# Patient Record
Sex: FEMALE | Race: WHITE | Employment: UNEMPLOYED | URBAN - METROPOLITAN AREA
[De-identification: names, ages, dates, MRNs, and addresses within clinical notes are randomized per-mention and may not be internally consistent; named-entity substitution may affect disease eponyms.]

---

## 2017-11-06 ENCOUNTER — OFFICE VISIT (OUTPATIENT)
Dept: FAMILY MEDICINE | Facility: CLINIC | Age: 47
End: 2017-11-06
Payer: COMMERCIAL

## 2017-11-06 VITALS
TEMPERATURE: 98.4 F | DIASTOLIC BLOOD PRESSURE: 74 MMHG | WEIGHT: 185 LBS | HEIGHT: 70 IN | RESPIRATION RATE: 18 BRPM | BODY MASS INDEX: 26.48 KG/M2 | HEART RATE: 82 BPM | SYSTOLIC BLOOD PRESSURE: 118 MMHG | OXYGEN SATURATION: 98 %

## 2017-11-06 DIAGNOSIS — R79.0 LOW FERRITIN: ICD-10-CM

## 2017-11-06 DIAGNOSIS — R77.8 ABNORMAL SERUM PROTEIN TEST: ICD-10-CM

## 2017-11-06 DIAGNOSIS — Z00.00 ROUTINE HISTORY AND PHYSICAL EXAMINATION OF ADULT: Primary | ICD-10-CM

## 2017-11-06 DIAGNOSIS — R79.89 ABNORMAL THYROID STIMULATING HORMONE (TSH) LEVEL: ICD-10-CM

## 2017-11-06 DIAGNOSIS — R20.9 DISTURBANCE OF SKIN SENSATION: ICD-10-CM

## 2017-11-06 DIAGNOSIS — F41.9 ANXIETY: ICD-10-CM

## 2017-11-06 PROBLEM — L71.9 ROSACEA: Status: ACTIVE | Noted: 2017-11-06

## 2017-11-06 PROBLEM — Z86.69 H/O EYE MUSCLE DISORDER: Status: ACTIVE | Noted: 2017-11-06

## 2017-11-06 LAB
ALBUMIN SERPL-MCNC: 4.1 G/DL (ref 3.4–5)
ALP SERPL-CCNC: 57 U/L (ref 40–150)
ALT SERPL W P-5'-P-CCNC: 34 U/L (ref 0–50)
ANION GAP SERPL CALCULATED.3IONS-SCNC: 8 MMOL/L (ref 3–14)
AST SERPL W P-5'-P-CCNC: 16 U/L (ref 0–45)
BASOPHILS # BLD AUTO: 0 10E9/L (ref 0–0.2)
BASOPHILS NFR BLD AUTO: 0.1 %
BILIRUB SERPL-MCNC: 0.6 MG/DL (ref 0.2–1.3)
BUN SERPL-MCNC: 13 MG/DL (ref 7–30)
CALCIUM SERPL-MCNC: 9.1 MG/DL (ref 8.5–10.1)
CHLORIDE SERPL-SCNC: 104 MMOL/L (ref 94–109)
CHOLEST SERPL-MCNC: 249 MG/DL
CO2 SERPL-SCNC: 26 MMOL/L (ref 20–32)
CREAT SERPL-MCNC: 0.64 MG/DL (ref 0.52–1.04)
DIFFERENTIAL METHOD BLD: NORMAL
EOSINOPHIL # BLD AUTO: 0.1 10E9/L (ref 0–0.7)
EOSINOPHIL NFR BLD AUTO: 1.2 %
ERYTHROCYTE [DISTWIDTH] IN BLOOD BY AUTOMATED COUNT: 13.9 % (ref 10–15)
FERRITIN SERPL-MCNC: 19 NG/ML (ref 8–252)
GFR SERPL CREATININE-BSD FRML MDRD: >90 ML/MIN/1.7M2
GLUCOSE SERPL-MCNC: 88 MG/DL (ref 70–99)
HCT VFR BLD AUTO: 40.5 % (ref 35–47)
HDLC SERPL-MCNC: 57 MG/DL
HGB BLD-MCNC: 13.3 G/DL (ref 11.7–15.7)
LDLC SERPL CALC-MCNC: 139 MG/DL
LYMPHOCYTES # BLD AUTO: 2.3 10E9/L (ref 0.8–5.3)
LYMPHOCYTES NFR BLD AUTO: 25.8 %
MCH RBC QN AUTO: 28.9 PG (ref 26.5–33)
MCHC RBC AUTO-ENTMCNC: 32.8 G/DL (ref 31.5–36.5)
MCV RBC AUTO: 88 FL (ref 78–100)
MONOCYTES # BLD AUTO: 0.5 10E9/L (ref 0–1.3)
MONOCYTES NFR BLD AUTO: 5.3 %
NEUTROPHILS # BLD AUTO: 6 10E9/L (ref 1.6–8.3)
NEUTROPHILS NFR BLD AUTO: 67.6 %
NONHDLC SERPL-MCNC: 192 MG/DL
PLATELET # BLD AUTO: 259 10E9/L (ref 150–450)
POTASSIUM SERPL-SCNC: 4.1 MMOL/L (ref 3.4–5.3)
PROT SERPL-MCNC: 8.9 G/DL (ref 6.8–8.8)
RBC # BLD AUTO: 4.61 10E12/L (ref 3.8–5.2)
SODIUM SERPL-SCNC: 138 MMOL/L (ref 133–144)
T4 FREE SERPL-MCNC: 0.85 NG/DL (ref 0.76–1.46)
TRIGL SERPL-MCNC: 267 MG/DL
TSH SERPL DL<=0.005 MIU/L-ACNC: 4.75 MU/L (ref 0.4–4)
VIT B12 SERPL-MCNC: 901 PG/ML (ref 193–986)
WBC # BLD AUTO: 8.9 10E9/L (ref 4–11)

## 2017-11-06 PROCEDURE — 80061 LIPID PANEL: CPT | Performed by: FAMILY MEDICINE

## 2017-11-06 PROCEDURE — 36415 COLL VENOUS BLD VENIPUNCTURE: CPT | Performed by: FAMILY MEDICINE

## 2017-11-06 PROCEDURE — 80050 GENERAL HEALTH PANEL: CPT | Performed by: FAMILY MEDICINE

## 2017-11-06 PROCEDURE — 82728 ASSAY OF FERRITIN: CPT | Performed by: FAMILY MEDICINE

## 2017-11-06 PROCEDURE — 84439 ASSAY OF FREE THYROXINE: CPT | Performed by: FAMILY MEDICINE

## 2017-11-06 PROCEDURE — 99386 PREV VISIT NEW AGE 40-64: CPT | Performed by: FAMILY MEDICINE

## 2017-11-06 PROCEDURE — 82607 VITAMIN B-12: CPT | Performed by: FAMILY MEDICINE

## 2017-11-06 RX ORDER — MULTIPLE VITAMINS W/ MINERALS TAB 9MG-400MCG
1 TAB ORAL DAILY
COMMUNITY

## 2017-11-06 ASSESSMENT — PAIN SCALES - GENERAL: PAINLEVEL: NO PAIN (0)

## 2017-11-06 NOTE — PROGRESS NOTES
"   SUBJECTIVE:   CC: Viki Woodard is an 47 year old woman who presents for preventive health visit.     Healthy Habits:    Do you get at least three servings of calcium containing foods daily (dairy, green leafy vegetables, etc.)? yes    Amount of exercise or daily activities, outside of work: 2-3 day(s) per week    Problems taking medications regularly not applicable    Medication side effects: No    Have you had an eye exam in the past two years? yes    Do you see a dentist twice per year? yes    Do you have sleep apnea, excessive snoring or daytime drowsiness?no      {additional problems to add (Optional):439425}    Today's PHQ-2 Score:   PHQ-2 ( 1999 Pfizer) 11/6/2017   Q1: Little interest or pleasure in doing things 0   Q2: Feeling down, depressed or hopeless 0   PHQ-2 Score 0       Abuse: Current or Past(Physical, Sexual or Emotional)- No  Do you feel safe in your environment - Yes    Social History   Substance Use Topics     Smoking status: Never Smoker     Smokeless tobacco: Never Used     Alcohol use Yes     The patient does not drink >3 drinks per day nor >7 drinks per week.    Reviewed orders with patient.  Reviewed health maintenance and updated orders accordingly - Yes  {Chronicprobdata (Optional):637308}    {Mammo Decision Support (Optional):394733}    Pertinent mammograms are reviewed under the imaging tab.  History of abnormal Pap smear: {PAP HX:729975}    Reviewed and updated as needed this visit by clinical staff  Tobacco  Allergies  Meds  Med Hx  Surg Hx  Fam Hx  Soc Hx        Reviewed and updated as needed this visit by Provider        {HISTORY OPTIONS (Optional):910113}    ROS:  {FEMALE PREVENTATIVE ROS:001536}    OBJECTIVE:   /74 (BP Location: Right arm, Patient Position: Chair, Cuff Size: Adult Regular)  Pulse 82  Temp 98.4  F (36.9  C) (Oral)  Resp 18  Ht 5' 9.75\" (1.772 m)  Wt 185 lb (83.9 kg)  LMP 10/22/2017 (Approximate)  SpO2 98%  Breastfeeding? No  BMI 26.74 " "kg/m2  EXAM:  {Exam Choices:734716}    ASSESSMENT/PLAN:   {Diag Picklist:269852}    COUNSELING:   {FEMALE COUNSELING MESSAGES:846728::\"Reviewed preventive health counseling, as reflected in patient instructions\"}    {BP Counseling- Complete if BP >= 120/80  (Optional):386296}     reports that she has never smoked. She has never used smokeless tobacco.  {Tobacco Cessation -- Complete if patient is a smoker (Optional):755340}  Estimated body mass index is 26.74 kg/(m^2) as calculated from the following:    Height as of this encounter: 5' 9.75\" (1.772 m).    Weight as of this encounter: 185 lb (83.9 kg).   {Weight Management Plan (ACO) Complete if BMI is abnormal-  Ages 18-64  BMI >24.9.  Age 65+ with BMI <23 or >30 (Optional):732424}    Counseling Resources:  ATP IV Guidelines  Pooled Cohorts Equation Calculator  Breast Cancer Risk Calculator  FRAX Risk Assessment  ICSI Preventive Guidelines  Dietary Guidelines for Americans, 2010  [employer] MyPlate  ASA Prophylaxis  Lung CA Screening    Elise Shannon MD  [facility]  "

## 2017-11-06 NOTE — MR AVS SNAPSHOT
After Visit Summary   11/6/2017    Viki Woodard    MRN: [id number]           Patient Information     Date Of Birth          1970        Visit Information        Provider Department      11/6/2017 7:40 AM Elise Shannon MD [facility]        Today's Diagnoses     Routine history and physical examination of adult    -  1    Disturbance of skin sensation        Anxiety          Care Instructions      Preventive Health Recommendations  Female Ages 40 to 49    Yearly exam:     See your health care provider every year in order to  1. Review health changes.   2. Discuss preventive care.    3. Review your medicines if your doctor prescribed any.      Get a Pap test every three years (unless you have an abnormal result and your provider advises testing more often).      If you get Pap tests with HPV test, you only need to test every 5 years, unless you have an abnormal result. You do not need a Pap test if your uterus was removed (hysterectomy) and you have not had cancer.      You should be tested each year for STDs (sexually transmitted diseases), if you're at risk.       Ask your doctor if you should have a mammogram.      Have a colonoscopy (test for colon cancer) if someone in your family has had colon cancer or polyps before age 50.       Have a cholesterol test every 5 years.       Have a diabetes test (fasting glucose) after age 45. If you are at risk for diabetes, you should have this test every 3 years.    Shots: Get a flu shot each year. Get a tetanus shot every 10 years.     Nutrition:     Eat at least 5 servings of fruits and vegetables each day.    Eat whole-grain bread, whole-wheat pasta and brown rice instead of white grains and rice.    Talk to your provider about Calcium and Vitamin D.     Lifestyle    Exercise at least 150 minutes a week (an average of 30 minutes a day, 5 days a week). This will help you control your weight and prevent disease.    Limit  alcohol to one drink per day.    No smoking.     Wear sunscreen to prevent skin cancer.    See your dentist every six months for an exam and cleaning.          Follow-ups after your visit        Additional Services     MENTAL HEALTH REFERRAL       Your provider has referred you to: [facility] - Counseling (Individual/Couples/Family) - [facility] [phone]   [email or web address]   *Patient will be contacted by [facility] scheduling partner, [facility]), to schedule an appointment.  Patients may also call [facility] to schedule.    All scheduling is subject to the client's specific insurance plan & benefits, provider/location availability, and provider clinical specialities.  Please arrive 15 minutes early for your first appointment and bring your completed paperwork.    Please be aware that coverage of these services is subject to the terms and limitations of your health insurance plan.  Call member services at your health plan with any benefit or coverage questions.                  Who to contact     If you have questions or need follow up information about today's clinic visit or your schedule please contact [facility] directly at [phone].  Normal or non-critical lab and imaging results will be communicated to you by MyChart, letter or phone within 4 business days after the clinic has received the results. If you do not hear from us within 7 days, please contact the clinic through [facility] or phone. If you have a critical or abnormal lab result, we will notify you by phone as soon as possible.  Submit refill requests through [employer] or call your pharmacy and they will forward the refill request to us. Please allow 3 business days for your refill to be completed.          Additional Information About Your Visit        [facility] Information     [employer] lets you send messages to your  "doctor, view your test results, renew your prescriptions, schedule appointments and more. To sign up, go to [email or web address] . Click on \"Log in\" on the left side of the screen, which will take you to the Welcome page. Then click on \"Sign up Now\" on the right side of the page.     You will be asked to enter the access code listed below, as well as some personal information. Please follow the directions to create your username and password.     Your access code is: [email or web address]  Expires: 2018  9:00 AM     Your access code will  in 90 days. If you need help or a new code, please call your [place] clinic or [phone].        Care EveryWhere ID     This is your Care EveryWhere ID. This could be used by other organizations to access your [place] medical records  [id number]        Your Vitals Were     Pulse Temperature Respirations Height Last Period Pulse Oximetry    82 98.4  F (36.9  C) (Oral) 18 5' 9.75\" (1.772 m) 10/22/2017 (Approximate) 98%    Breastfeeding? BMI (Body Mass Index)                No 26.74 kg/m2           Blood Pressure from Last 3 Encounters:   17 118/74    Weight from Last 3 Encounters:   17 185 lb (83.9 kg)              We Performed the Following     CBC with platelets differential     Comprehensive metabolic panel     Ferritin     Lipid panel reflex to direct LDL Fasting     MENTAL HEALTH REFERRAL     TSH with free T4 reflex     Vitamin B12        Primary Care Provider Office Phone # Fax #    [facility] [phone] [address] [address]        Equal Access to Services     [facility]: Hadii jorge a ku hadasho Sogenaroali, waaxda luqadaha, qaybta kaalmada adejaquelin, octavia plasencia. [facility] [phone].    ATENCIÓN: Si habla español, tiene a kim disposición servicios gratuitos de asistencia lingüística. Llame al [phone].    We comply with applicable federal civil rights laws and " [place] laws. We do not discriminate on the basis of race, color, national origin, age, disability, sex, sexual orientation, or gender identity.            Thank you!     Thank you for choosing [facility]  for your care. Our goal is always to provide you with excellent care. Hearing back from our patients is one way we can continue to improve our services. Please take a few minutes to complete the written survey that you may receive in the mail after your visit with us. Thank you!             Your Updated Medication List - Protect others around you: Learn how to safely use, store and throw away your medicines at www.disposemymeds.org.          This list is accurate as of: 11/6/17  9:00 AM.  Always use your most recent med list.                   Brand Name Dispense Instructions for use Diagnosis    METROGEL EX           Multi-vitamin Tabs tablet      Take 1 tablet by mouth daily

## 2017-11-06 NOTE — PROGRESS NOTES
"   SUBJECTIVE:   CC: Viki Woodard is an 47 year old woman who presents for preventive health visit.     Healthy Habits:    Do you get at least three servings of calcium containing foods daily (dairy, green leafy vegetables, etc.)? yes    Amount of exercise or daily activities, outside of work: 2-3 day(s) per week    Problems taking medications regularly not applicable    Medication side effects: No    Have you had an eye exam in the past two years? yes    Do you see a dentist twice per year? yes    Do you have sleep apnea, excessive snoring or daytime drowsiness?no    Reviewed surgical hx, family hx, past medical hx    Pt has not had a primary physical in years and would like some baseline screening completed.     She started having numbness in hands bilaterally about 2 weeks ago. She does yoga and is wondering if she did something that could have caused this. She said her hands feel somewhat \"asleep\" and fingernails feels \"weird/disconnected.\" numbness is present at the tips of her fingers and in her palms. She has also noticed her legs and torso feel numb when touching them.   -has always felt anxious/worried but never dx'ed or treated. Unsure if numbness would be related to this- does not have more anxiety recently. Others have told her she worries too much.  Denies: pain, loss of strength, changes in vision/hearing, changes in speech, unusual rashes this summer, joint pain, typing often, neck pain, back pain, street drug use, new headaches    Weight varies between gaining and losing 5 lbs.  Wt Readings from Last 5 Encounters:   11/06/17 185 lb (83.9 kg)     Had minor abdominal pain on left side after eating sunflower seeds about 2 weeks ago. Sx's have resolved. She says she has a \"sensitive stomach\" and is careful with what she eats.     Still having regular periods. Periods last for 3 days and she changes her tampon/pad every couple of hours.  Obgyn: Dr. Gutierrez [facility] will see " anually        Today's PHQ-2 Score:   PHQ-2 ( 1999 Pfizer) 11/6/2017   Q1: Little interest or pleasure in doing things 0   Q2: Feeling down, depressed or hopeless 0   PHQ-2 Score 0       Abuse: Current or Past(Physical, Sexual or Emotional)- No  Do you feel safe in your environment - Yes    Social History   Substance Use Topics     Smoking status: Never Smoker     Smokeless tobacco: Never Used     Alcohol use Yes     The patient does not drink >3 drinks per day nor >7 drinks per week.    Reviewed orders with patient.  Reviewed health maintenance and updated orders accordingly - Yes  Labs reviewed in EPIC  BP Readings from Last 3 Encounters:   11/06/17 118/74    Wt Readings from Last 3 Encounters:   11/06/17 185 lb (83.9 kg)                  There is no problem list on file for this patient.    Past Surgical History:   Procedure Laterality Date     EYE SURGERY      Strabismus repair x 3       Social History   Substance Use Topics     Smoking status: Never Smoker     Smokeless tobacco: Never Used     Alcohol use Yes     Family History   Problem Relation Age of Onset     Genetic Disorder Father      Heart               Patient under age 50, mutual decision reflected in health maintenance.        Pertinent mammograms are reviewed under the imaging tab.  History of abnormal Pap smear: NO - age 30- 65 PAP every 3 years recommended    Reviewed and updated as needed this visit by clinical staffTobacco  Allergies  Meds  Med Hx  Surg Hx  Fam Hx  Soc Hx        Reviewed and updated as needed this visit by Provider            ROS:     ROS: 10 point ROS neg other than the symptoms noted above in the HPI.    This document serves as a record of the services and decisions personally performed and made by Elise Shannon MD. It was created on her behalf by Jazz Bansal, a trained medical scribe. The creation of this document is based the provider's statements to the medical scribe.  Jazz Bansal November 6, 2017  "8:27 AM        OBJECTIVE:   /74 (BP Location: Right arm, Patient Position: Chair, Cuff Size: Adult Regular)  Pulse 82  Temp 98.4  F (36.9  C) (Oral)  Resp 18  Ht 5' 9.75\" (1.772 m)  Wt 185 lb (83.9 kg)  LMP 10/22/2017 (Approximate)  SpO2 98%  Breastfeeding? No  BMI 26.74 kg/m2  EXAM:  GENERAL: healthy, alert and no distress, overweight   EYES: Eyes grossly normal to inspection, PERRL and conjunctivae and sclerae normal  HENT: ear canals and TM's normal, nose and mouth without ulcers or lesions  NECK: no adenopathy, no asymmetry, masses, or scars and thyroid normal to palpation  RESP: lungs clear to auscultation - no rales, rhonchi or wheezes  CV: regular rate and rhythm, normal S1 S2, no S3 or S4, no murmur, click or rub, no peripheral edema and peripheral pulses strong  ABDOMEN: soft, nontender, no hepatosplenomegaly, no masses and bowel sounds normal  MS: no gross musculoskeletal defects noted, no edema  SKIN: no suspicious lesions or rashes  NEURO: Normal strength and tone, mentation intact and speech normal, normal sensation,   PSYCH: mentation appears normal, affect normal/bright    No results found for this or any previous visit (from the past 24 hour(s)).    ASSESSMENT/PLAN:   1. Routine history and physical examination of adult  Pt declined flu vaccine at this time but will get when feeling better.   Will obtain pap/mammo records from gyn clinic    2. Disturbance of skin sensation  unclear etiology of sx's- no focal neuro pattern. Labs completed today. Follow with MRI brain and neuro consult if no obvious source of sx's found. Certainly anxiety could be contributing, but no recent flare of these sx's.   - TSH with free T4 reflex  - Comprehensive metabolic panel  - CBC with platelets differential  - Vitamin B12  - Lipid panel reflex to direct LDL Fasting  - Ferritin    3. Anxiety  Discussed counseling as first approach to addressing   - MENTAL HEALTH REFERRAL      Patient Instructions "     Preventive Health Recommendations  Female Ages 40 to 49    Yearly exam:     See your health care provider every year in order to  1. Review health changes.   2. Discuss preventive care.    3. Review your medicines if your doctor prescribed any.      Get a Pap test every three years (unless you have an abnormal result and your provider advises testing more often).      If you get Pap tests with HPV test, you only need to test every 5 years, unless you have an abnormal result. You do not need a Pap test if your uterus was removed (hysterectomy) and you have not had cancer.      You should be tested each year for STDs (sexually transmitted diseases), if you're at risk.       Ask your doctor if you should have a mammogram.      Have a colonoscopy (test for colon cancer) if someone in your family has had colon cancer or polyps before age 50.       Have a cholesterol test every 5 years.       Have a diabetes test (fasting glucose) after age 45. If you are at risk for diabetes, you should have this test every 3 years.    Shots: Get a flu shot each year. Get a tetanus shot every 10 years.     Nutrition:     Eat at least 5 servings of fruits and vegetables each day.    Eat whole-grain bread, whole-wheat pasta and brown rice instead of white grains and rice.    Talk to your provider about Calcium and Vitamin D.     Lifestyle    Exercise at least 150 minutes a week (an average of 30 minutes a day, 5 days a week). This will help you control your weight and prevent disease.    Limit alcohol to one drink per day.    No smoking.     Wear sunscreen to prevent skin cancer.    See your dentist every six months for an exam and cleaning.      COUNSELING:   Reviewed preventive health counseling, as reflected in patient instructions       Regular exercise       Healthy diet/nutrition       Vision screening       Hearing screening         reports that she has never smoked. She has never used smokeless tobacco.    Estimated body mass  "index is 26.74 kg/(m^2) as calculated from the following:    Height as of this encounter: 5' 9.75\" (1.772 m).    Weight as of this encounter: 185 lb (83.9 kg).   Weight management plan: Discussed healthy diet and exercise guidelines and patient will follow up in 12 months in clinic to re-evaluate.    Counseling Resources:  ATP IV Guidelines  Pooled Cohorts Equation Calculator  Breast Cancer Risk Calculator  FRAX Risk Assessment  ICSI Preventive Guidelines  Dietary Guidelines for Americans, 2010  [place]DA's MyPlate  ASA Prophylaxis  Lung CA Screening      Length of visit was 27 minutes with more than 50 percent of that time used for discussing medical concerns and education    This document serves as a record of the services and decisions personally performed and made by Elise Shannon MD. It was created on her behalf by Jazz Bansal, a trained medical scribe. The creation of this document is based the provider's statements to the medical scribe.  Jazz Bansal November 6, 2017 8:26 AM      Elise Shannon MD  [facility]  "

## 2017-11-06 NOTE — NURSING NOTE
"Chief Complaint   Patient presents with     Physical       Initial /74 (BP Location: Right arm, Patient Position: Chair, Cuff Size: Adult Regular)  Pulse 82  Temp 98.4  F (36.9  C) (Oral)  Resp 18  Ht 5' 9.75\" (1.772 m)  Wt 185 lb (83.9 kg)  LMP 10/22/2017 (Approximate)  SpO2 98%  Breastfeeding? No  BMI 26.74 kg/m2 Estimated body mass index is 26.74 kg/(m^2) as calculated from the following:    Height as of this encounter: 5' 9.75\" (1.772 m).    Weight as of this encounter: 185 lb (83.9 kg).  Medication Reconciliation: complete     Zee La MA       "

## 2017-11-07 PROBLEM — R79.0 LOW FERRITIN: Status: ACTIVE | Noted: 2017-11-07

## 2017-11-07 PROBLEM — R77.8 ABNORMAL SERUM PROTEIN TEST: Status: ACTIVE | Noted: 2017-11-07

## 2017-11-07 PROBLEM — R79.89 ABNORMAL THYROID STIMULATING HORMONE (TSH) LEVEL: Status: ACTIVE | Noted: 2017-11-07

## 2017-11-07 PROBLEM — F41.9 ANXIETY: Status: ACTIVE | Noted: 2017-11-07

## 2017-11-20 DIAGNOSIS — R77.8 ABNORMAL SERUM PROTEIN TEST: ICD-10-CM

## 2017-11-20 LAB
ALBUMIN SERPL-MCNC: 3.9 G/DL (ref 3.4–5)
ALP SERPL-CCNC: 53 U/L (ref 40–150)
ALT SERPL W P-5'-P-CCNC: 31 U/L (ref 0–50)
AST SERPL W P-5'-P-CCNC: 16 U/L (ref 0–45)
BILIRUB DIRECT SERPL-MCNC: <0.1 MG/DL (ref 0–0.2)
BILIRUB SERPL-MCNC: 0.3 MG/DL (ref 0.2–1.3)
PROT SERPL-MCNC: 8.1 G/DL (ref 6.8–8.8)
TSH SERPL DL<=0.005 MIU/L-ACNC: 3.07 MU/L (ref 0.4–4)

## 2017-11-20 PROCEDURE — 84443 ASSAY THYROID STIM HORMONE: CPT | Performed by: FAMILY MEDICINE

## 2017-11-20 PROCEDURE — 84166 PROTEIN E-PHORESIS/URINE/CSF: CPT | Performed by: FAMILY MEDICINE

## 2017-11-20 PROCEDURE — 86376 MICROSOMAL ANTIBODY EACH: CPT | Performed by: FAMILY MEDICINE

## 2017-11-20 PROCEDURE — 86800 THYROGLOBULIN ANTIBODY: CPT | Performed by: FAMILY MEDICINE

## 2017-11-20 PROCEDURE — 00000402 ZZHCL STATISTIC TOTAL PROTEIN: Performed by: FAMILY MEDICINE

## 2017-11-20 PROCEDURE — 36415 COLL VENOUS BLD VENIPUNCTURE: CPT | Performed by: FAMILY MEDICINE

## 2017-11-20 PROCEDURE — 84165 PROTEIN E-PHORESIS SERUM: CPT | Performed by: FAMILY MEDICINE

## 2017-11-20 PROCEDURE — 80076 HEPATIC FUNCTION PANEL: CPT | Performed by: FAMILY MEDICINE

## 2017-11-21 LAB
ALBUMIN SERPL ELPH-MCNC: 4.5 G/DL (ref 3.7–5.1)
ALPHA1 GLOB SERPL ELPH-MCNC: 0.3 G/DL (ref 0.2–0.4)
ALPHA2 GLOB SERPL ELPH-MCNC: 0.7 G/DL (ref 0.5–0.9)
B-GLOBULIN SERPL ELPH-MCNC: 0.9 G/DL (ref 0.6–1)
GAMMA GLOB SERPL ELPH-MCNC: 1.4 G/DL (ref 0.7–1.6)
M PROTEIN SERPL ELPH-MCNC: 0 G/DL
PROT PATTERN SERPL ELPH-IMP: NORMAL
PROT PATTERN UR ELPH-IMP: NORMAL
THYROGLOB AB SERPL IA-ACNC: <20 IU/ML (ref 0–40)
THYROPEROXIDASE AB SERPL-ACNC: 242 IU/ML

## 2017-12-14 ENCOUNTER — TELEPHONE (OUTPATIENT)
Dept: FAMILY MEDICINE | Facility: CLINIC | Age: 47
End: 2017-12-14

## 2017-12-14 ENCOUNTER — PRE VISIT (OUTPATIENT)
Dept: NEUROLOGY | Facility: CLINIC | Age: 47
End: 2017-12-14

## 2017-12-14 NOTE — TELEPHONE ENCOUNTER
Would be ideal if got mri prior to neuro visit but not required.     Usually the imaging center will start the pre-authorization with her insurance and let me know if further input is needed from me. Would suggest she check with imaging center on this.

## 2017-12-14 NOTE — TELEPHONE ENCOUNTER
Reason for Call:  Other     Detailed comments: Patient has questions on doing MRI before or after Neurologist. Also, needs pre-authorization for MRI from insurance.     Phone Number Patient can be reached at: Cell number on file:    Telephone Information:   Mobile [phone]       Best Time: any    Can we leave a detailed message on this number? YES    Call taken on 12/14/2017 at 1:10 PM by Veronica Nuñez

## 2017-12-14 NOTE — TELEPHONE ENCOUNTER
[facility] CLINICAL DOCUMENTATION    Pre-Visit Planning   PREVISIT INFORMATION                                                    Viki Woodard scheduled for future visit at [facility] specialty clinics.    Patient is scheduled to see Ina (provider) on 12/21/17 (date)  Reason for visit: numbness and tingling  Referring provider Elise Shannon MD  Has patient seen previous specialist? No  Medical Records:  Available in chart.  Patient was previously seen at a [place] or [facility] facility.    REVIEW                                                      New patient packet mailed to patient: Yes  Medication reconciliation complete: Yes      Current Outpatient Prescriptions   Medication Sig Dispense Refill     multivitamin, therapeutic with minerals (MULTI-VITAMIN) TABS tablet Take 1 tablet by mouth daily       MetroNIDAZOLE (METROGEL EX) Externally apply topically every other day          Allergies: Codeine    (insert provider dot-phrase for provider specific visit requirements)    PLAN/FOLLOW-UP NEEDED                                                      Previsit review complete.  Patient will see provider at future scheduled appointment.     Patient Reminders Given:  Please, make sure you bring an updated list of your medications.   If you are having a procedure, please, present 15 minutes early.  If you need to cancel or reschedule,please call [phone].    Darla Severin-Brown

## 2017-12-15 NOTE — TELEPHONE ENCOUNTER
Called patient -     Informed her of this - she was told by imaging center that ordering provider should do pre auth for MRI. Informed her we do not complete this and she will follow up with imaging center on this. She will also try and schedule her MRI appointment prior to her neurology appointment as well.    Will follow up if she has any other questions    Will Carmelo MIRAMONTES

## 2017-12-21 ENCOUNTER — OFFICE VISIT (OUTPATIENT)
Dept: NEUROLOGY | Facility: CLINIC | Age: 47
End: 2017-12-21
Attending: FAMILY MEDICINE
Payer: COMMERCIAL

## 2017-12-21 VITALS
BODY MASS INDEX: 27.68 KG/M2 | HEART RATE: 79 BPM | OXYGEN SATURATION: 96 % | SYSTOLIC BLOOD PRESSURE: 119 MMHG | WEIGHT: 186.9 LBS | DIASTOLIC BLOOD PRESSURE: 82 MMHG | HEIGHT: 69 IN

## 2017-12-21 DIAGNOSIS — R20.9 SENSORY DISTURBANCE: Primary | ICD-10-CM

## 2017-12-21 PROCEDURE — 99202 OFFICE O/P NEW SF 15 MIN: CPT | Performed by: PSYCHIATRY & NEUROLOGY

## 2017-12-21 RX ORDER — DIAZEPAM 5 MG
TABLET ORAL
Qty: 2 TABLET | Refills: 0 | Status: SHIPPED | OUTPATIENT
Start: 2017-12-21 | End: 2018-02-12

## 2017-12-21 ASSESSMENT — PAIN SCALES - GENERAL: PAINLEVEL: NO PAIN (0)

## 2017-12-21 NOTE — MR AVS SNAPSHOT
After Visit Summary   12/21/2017    Viki Woodard    MRN: [id number]           Patient Information     Date Of Birth          1970        Visit Information        Provider Department      12/21/2017 8:00 AM Edi Buckley MD [facility]        Today's Diagnoses     Sensory disturbance    -  1       Follow-ups after your visit        Follow-up notes from your care team     Call patient with results Return if symptoms worsen or fail to improve.      Your next 10 appointments already scheduled     Dec 31, 2017  1:00 PM CST   (Arrive by 12:45 PM)   MR BRAIN W/O & W CONTRAST with [id number]   [facility] MRI ([facility])    [address] [address] [phone]           Take your medicines as usual, unless your doctor tells you not to. Bring a list of your current medicines to your exam (including vitamins, minerals and over-the-counter drugs).  You will be given intravenous contrast for this exam. To prepare:   The day before your exam, drink extra fluids at least six 8-ounce glasses (unless your doctor tells you to restrict your fluids).   Have a blood test (creatinine test) within 30 days of your exam. Go to your clinic or Diagnostic Imaging Department for this test.  The MRI machine uses a strong magnet. Please wear clothes without metal (snaps, zippers). A sweatsuit works well, or we may give you a hospital gown.  Please remove any body piercings and hair extensions before you arrive. You will also remove watches, jewelry, hairpins, wallets, dentures, partial dental plates and hearing aids. You may wear contact lenses, and you may be able to wear your rings. We have a safe place to keep your personal items, but it is safer to leave them at home.   **IMPORTANT** THE INSTRUCTIONS BELOW ARE ONLY FOR THOSE PATIENTS WHO HAVE BEEN TOLD THEY WILL RECEIVE SEDATION OR GENERAL ANESTHESIA DURING THEIR MRI PROCEDURE:   IF YOU WILL RECEIVE SEDATION (take medicine to help you relax during your exam):   You must get the medicine from your doctor before you arrive. Bring the medicine to the exam. Do not take it at home.   Arrive one hour early. Bring someone who can take you home after the test. Your medicine will make you sleepy. After the exam, you may not drive, take a bus or take a taxi by yourself.   No eating 8 hours before your exam. You may have clear liquids up until 4 hours before your exam. (Clear liquids include water, clear tea, black coffee and fruit juice without pulp.)  IF YOU WILL RECEIVE ANESTHESIA (be asleep for your exam):   Arrive 1 1/2 hours early. Bring someone who can take you home after the test. You may not drive, take a bus or take a taxi by yourself.   No eating 8 hours before your exam. You may have clear liquids up until 4 hours before your exam. (Clear liquids include water, clear tea, black coffee and fruit juice without pulp.)  Please call the Imaging Department at your exam site with any questions.            Dec 31, 2017  1:45 PM CST   (Arrive by 1:30 PM)   MR CERVICAL SPINE W/O & W CONTRAST with [id number]   [facility] MRI ([facility])    [address] [address] [phone]           Take your medicines as usual, unless your doctor tells you not to. Bring a list of your current medicines to your exam (including vitamins, minerals and over-the-counter drugs).  You will be given intravenous contrast for this exam. To prepare:   The day before your exam, drink extra fluids at least six 8-ounce glasses (unless your doctor tells you to restrict your fluids).   Have a blood test (creatinine test) within 30 days of your exam. Go to your clinic or Diagnostic Imaging Department for this test.  The MRI machine uses a strong magnet. Please wear clothes without metal (snaps, zippers). A sweatsuit works well, or we may give you a hospital  gown.  Please remove any body piercings and hair extensions before you arrive. You will also remove watches, jewelry, hairpins, wallets, dentures, partial dental plates and hearing aids. You may wear contact lenses, and you may be able to wear your rings. We have a safe place to keep your personal items, but it is safer to leave them at home.   **IMPORTANT** THE INSTRUCTIONS BELOW ARE ONLY FOR THOSE PATIENTS WHO HAVE BEEN TOLD THEY WILL RECEIVE SEDATION OR GENERAL ANESTHESIA DURING THEIR MRI PROCEDURE:  IF YOU WILL RECEIVE SEDATION (take medicine to help you relax during your exam):   You must get the medicine from your doctor before you arrive. Bring the medicine to the exam. Do not take it at home.   Arrive one hour early. Bring someone who can take you home after the test. Your medicine will make you sleepy. After the exam, you may not drive, take a bus or take a taxi by yourself.   No eating 8 hours before your exam. You may have clear liquids up until 4 hours before your exam. (Clear liquids include water, clear tea, black coffee and fruit juice without pulp.)  IF YOU WILL RECEIVE ANESTHESIA (be asleep for your exam):   Arrive 1 1/2 hours early. Bring someone who can take you home after the test. You may not drive, take a bus or take a taxi by yourself.   No eating 8 hours before your exam. You may have clear liquids up until 4 hours before your exam. (Clear liquids include water, clear tea, black coffee and fruit juice without pulp.)  Please call the Imaging Department at your exam site with any questions.              Future tests that were ordered for you today     Open Future Orders        Priority Expected Expires Ordered    MR Cervical Spine w/o & w Contrast Routine 12/22/2017 12/21/2018 12/21/2017            Who to contact     If you have questions or need follow up information about today's clinic visit or your schedule please contact [facility] directly at [phone].  Normal or  "non-critical lab and imaging results will be communicated to you by MyChart, letter or phone within 4 business days after the clinic has received the results. If you do not hear from us within 7 days, please contact the clinic through [employer] or phone. If you have a critical or abnormal lab result, we will notify you by phone as soon as possible.  Submit refill requests through [employer] or call your pharmacy and they will forward the refill request to us. Please allow 3 business days for your refill to be completed.          Additional Information About Your Visit        [employer] Information     [employer] gives you secure access to your electronic health record. If you see a primary care provider, you can also send messages to your care team and make appointments. If you have questions, please call your primary care clinic.  If you do not have a primary care provider, please call [phone] and they will assist you.      [employer] is an electronic gateway that provides easy, online access to your medical records. With [employer], you can request a clinic appointment, read your test results, renew a prescription or communicate with your care team.     To access your existing account, please contact your [facility] or call [phone] for assistance.        Care EveryWhere ID     This is your Care EveryWhere ID. This could be used by other organizations to access your [facility] medical records  [id number]        Your Vitals Were     Pulse Height Pulse Oximetry BMI (Body Mass Index)          79 1.753 m (5' 9\") 96% 27.6 kg/m2         Blood Pressure from Last 3 Encounters:   12/21/17 119/82   11/06/17 118/74    Weight from Last 3 Encounters:   12/21/17 84.8 kg (186 lb 14.4 oz)   11/06/17 83.9 kg (185 lb)                 Today's Medication Changes          These changes are accurate as of: 12/21/17  8:25 AM.  If you have any questions, ask your nurse or doctor.               Start taking these " medicines.        Dose/Directions    diazepam 5 MG tablet   Commonly known as:  VALIUM   Used for:  Sensory disturbance   Started by:  Edi Buckley MD        One tablet 1/2 hour before MRI. May take second tablet if needed   Quantity:  2 tablet   Refills:  0            Where to get your medicines      Some of these will need a paper prescription and others can be bought over the counter.  Ask your nurse if you have questions.     Bring a paper prescription for each of these medications     diazepam 5 MG tablet                Primary Care Provider Office Phone # Fax #    Elise Shara Shannon [phone] [address]  [facility] [address]        Equal Access to Services     [facility]: Hadii jorge a fitzgerald hadasho Sopraveena, waaxda luqadaha, qaybta kaalmada adegianayada, octavia mccray . So [facility] [phone].    ATENCIÓN: Si habla español, tiene a kim disposición servicios gratuitos de asistencia lingüística. [facility] [phone].    We comply with applicable federal civil rights laws and [place] laws. We do not discriminate on the basis of race, color, national origin, age, disability, sex, sexual orientation, or gender identity.            Thank you!     Thank you for choosing [facility]  for your care. Our goal is always to provide you with excellent care. Hearing back from our patients is one way we can continue to improve our services. Please take a few minutes to complete the written survey that you may receive in the mail after your visit with us. Thank you!             Your Updated Medication List - Protect others around you: Learn how to safely use, store and throw away your medicines at www.disposemymeds.org.          This list is accurate as of: 12/21/17  8:25 AM.  Always use your most recent med list.                   Brand Name Dispense Instructions for use Diagnosis    diazepam 5 MG tablet    VALIUM    2 tablet    One tablet  1/2 hour before MRI. May take second tablet if needed    Sensory disturbance       METROGEL EX      Externally apply topically every other day        Multi-vitamin Tabs tablet      Take 1 tablet by mouth daily

## 2017-12-21 NOTE — NURSING NOTE
"Efrainarely ALAN Woodard's goals for this visit include: consult  She requests these members of her care team be copied on today's visit information:     PCP: Elise Shannon    Referring Provider:  Elise Shannon MD  [address] [place] [address]    Chief Complaint   Patient presents with     Consult       Initial /82  Pulse 79  Ht 1.753 m (5' 9\")  Wt 84.8 kg (186 lb 14.4 oz)  SpO2 96%  BMI 27.6 kg/m2 Estimated body mass index is 27.6 kg/(m^2) as calculated from the following:    Height as of this encounter: 1.753 m (5' 9\").    Weight as of this encounter: 84.8 kg (186 lb 14.4 oz).  Medication Reconciliation: complete    Do you need any medication refills at today's visit? n  "

## 2017-12-21 NOTE — LETTER
12/21/2017         RE: Viki Woodard  [address] [place] [address]        Dear Colleague,    Thank you for referring your patient, Viki Woodard, to the [facility]. Please see a copy of my visit note below.    REASON FOR REFERRAL:  Viki Woodard is a 47-year-old female, referred by Dr. Shannon, for evaluation of a sensory disturbance.      HISTORY OF PRESENT ILLNESS:  In October, she began experiencing sensory symptoms.  She was doing yoga at the time, but she does not recall any specific injury.  Initially, it was just a lack of sensation involving her hands and feet, but it ultimately over a few days spread up, particularly to the left leg above the knee and then onto the lower trunk.  She also developed impaired sensation involving the forearms.  She indicates that the symptoms gradually resolved, although she still has a sense of numbness and stiffness involving her hands.  She is uncertain if she is weak.  It is more of a lack of sensation that causes her problems.  She feels a bit clumsy.  She has not had any cranial nerve symptoms.  She has had no neck pain.  She denies Lhermitte's phenomenon.  There has been no trouble with bowel or bladder control.      She had no preceding illness prior to the onset of the sensory symptoms.  She has no prior history of neurologic difficulties.  She has had strabismus surgery.      Laboratory work is notable for on one occasion a slightly elevated TSH of 4.75 with a normal T4.  She did have an elevated thyroperoxidase antibody, and indicates that she has been diagnosed with Hashimoto's thyroiditis.  Other normal or negative laboratory tests included a CBC, comprehensive metabolic panel, B12 (901), serum protein electrophoresis, and urine protein electrophoresis.      PAST MEDICAL HISTORY:  Otherwise notable for rosacea.      CURRENT MEDICATIONS:  MetroGel, and a multivitamin.      ALLERGIES:  She is intolerant of  codeine (nausea).      FAMILY HISTORY:  There is no family history of neurologic disease.      SOCIAL HISTORY:  She currently does not work outside the home.  She does not smoke or use alcohol.      EXAM:   GENERAL:  The patient is alert and cooperative.  She is slightly tense.   VITAL SIGNS:  Heart rate 79.  Blood pressure 119/82.   NEURO:  She has a slight esotropia.  Pupils are equal, round, and react well to light.  She has full extraocular motility.  Fundi are unremarkable and, otherwise, cranial nerves II-XII are intact.  Motor examination reveals normal strength.  Her sensory examination is intact to pin, position, and vibration.  There is no sensory loss over the trunk.  Finger-to-nose and heel-to-shin are done well.  She has some very mild difficulty tandem walking, but is able to do so.  Reflexes are 2+.  Right plantar response is equivocal; the left is flexor.      IMPRESSION:  Sensory disturbance.      While her examination is relatively unrevealing, I am concerned about a central nervous system process and, in particular, the possibility of demyelinating disease, given her history.      PLAN:  I do want to get a cervical MRI scan with and without contrast, and this was ordered.  She is already scheduled for a head MRI scan with and without contrast, and this will be done as well.      I will communicate the results of the MRI imaging studies to the patient when available.  If they are negative, I may pursue EMG nerve conduction studies, but my sense is that this is a central process rather than a peripheral one.         MAXI MITCHELL MD             D: 2017 08:26   T: 2017 06:07   MT: [id number]      Name:     CLAUDINE WARD   MRN:      [id number]        Account:      [id number]   :      1970           Visit Date:   2017      Document: [id number]       cc: Elise Shannon MD       Again, thank you for allowing me to participate in the care of your patient.         Sincerely,        Edi Buckley MD

## 2017-12-22 NOTE — PROGRESS NOTES
REASON FOR REFERRAL:  Viki Woodard is a 47-year-old female, referred by Dr. Shannon, for evaluation of a sensory disturbance.      HISTORY OF PRESENT ILLNESS:  In October, she began experiencing sensory symptoms.  She was doing yoga at the time, but she does not recall any specific injury.  Initially, it was just a lack of sensation involving her hands and feet, but it ultimately over a few days spread up, particularly to the left leg above the knee and then onto the lower trunk.  She also developed impaired sensation involving the forearms.  She indicates that the symptoms gradually resolved, although she still has a sense of numbness and stiffness involving her hands.  She is uncertain if she is weak.  It is more of a lack of sensation that causes her problems.  She feels a bit clumsy.  She has not had any cranial nerve symptoms.  She has had no neck pain.  She denies Lhermitte's phenomenon.  There has been no trouble with bowel or bladder control.      She had no preceding illness prior to the onset of the sensory symptoms.  She has no prior history of neurologic difficulties.  She has had strabismus surgery.      Laboratory work is notable for on one occasion a slightly elevated TSH of 4.75 with a normal T4.  She did have an elevated thyroperoxidase antibody, and indicates that she has been diagnosed with Hashimoto's thyroiditis.  Other normal or negative laboratory tests included a CBC, comprehensive metabolic panel, B12 (901), serum protein electrophoresis, and urine protein electrophoresis.      PAST MEDICAL HISTORY:  Otherwise notable for rosacea.      CURRENT MEDICATIONS:  MetroGel, and a multivitamin.      ALLERGIES:  She is intolerant of codeine (nausea).      FAMILY HISTORY:  There is no family history of neurologic disease.      SOCIAL HISTORY:  She currently does not work outside the home.  She does not smoke or use alcohol.      EXAM:   GENERAL:  The patient is alert and cooperative.  She is  slightly tense.   VITAL SIGNS:  Heart rate 79.  Blood pressure 119/82.   NEURO:  She has a slight esotropia.  Pupils are equal, round, and react well to light.  She has full extraocular motility.  Fundi are unremarkable and, otherwise, cranial nerves II-XII are intact.  Motor examination reveals normal strength.  Her sensory examination is intact to pin, position, and vibration.  There is no sensory loss over the trunk.  Finger-to-nose and heel-to-shin are done well.  She has some very mild difficulty tandem walking, but is able to do so.  Reflexes are 2+.  Right plantar response is equivocal; the left is flexor.      IMPRESSION:  Sensory disturbance.      While her examination is relatively unrevealing, I am concerned about a central nervous system process and, in particular, the possibility of demyelinating disease, given her history.      PLAN:  I do want to get a cervical MRI scan with and without contrast, and this was ordered.  She is already scheduled for a head MRI scan with and without contrast, and this will be done as well.      I will communicate the results of the MRI imaging studies to the patient when available.  If they are negative, I may pursue EMG nerve conduction studies, but my sense is that this is a central process rather than a peripheral one.     ADDENDUM 1/3/18: Reviewed MRIs with patient. Couple small brain white matter lesions. 2 Cervical cord lesions one of which enhances. Discussed demyelinating disease with patient. Will have her see Dr Miguel MITCHELL MD             D: 2017 08:26   T: 2017 06:07   MT: [id number]      Name:     CLAUDINE WARD   MRN:      [id number]        Account:      [id number]   :      1970           Visit Date:   2017      Document: [id number]       cc: Elise Shannon MD

## 2017-12-30 ENCOUNTER — NURSE TRIAGE (OUTPATIENT)
Dept: NURSING | Facility: CLINIC | Age: 47
End: 2017-12-30

## 2017-12-30 NOTE — TELEPHONE ENCOUNTER
----- Message from Agatha Hale sent at 12/30/2017 10:02 AM CST -----  Reason for call:  Medication   If this is a refill request, has the caller requested the refill from the pharmacy already? No  Will the patient be using a [place] [facility] [place] [facility]? No  Name of the pharmacy and phone number for the current request: [facility]    Name of the medication requested: Medication for a MRI     Other request: No.    Phone number to reach patient:  Home number on file [phone] (home)    Best Time:  Anytime.    Can we leave a detailed message on this number?  YES

## 2017-12-30 NOTE — TELEPHONE ENCOUNTER
Reason for Call: Pt having a MRI tomorrow and Rx was to be written for Valium for before but her Pharmacy doesn't have it and calling requesting another Rx to be sent . Paged Adult neuro resident pager to call back Pt at [phone]  And Pt has pharmacy phone number available for resident.   Patient Recommendations/Teaching: Given [phone] push [phone] as instructions , if no response to page within 20-30 minutes .   .Romy Nicholas RN [facility].

## 2017-12-30 NOTE — TELEPHONE ENCOUNTER
----- Message from Agatha Hale sent at 12/30/2017 12:45 PM CST -----  Reason for call:  Medication   If this is a refill request, has the caller requested the refill from the pharmacy already? No  Will the patient be using a [facility]? No  Name of the pharmacy and phone number for the current request: [facility]     Name of the medication requested: Medication MRI    Other request: No.    Phone number to reach patient:  Home number on file [phone] (home)    Best Time:  Anytime.    Can we leave a detailed message on this number?  YES

## 2017-12-30 NOTE — TELEPHONE ENCOUNTER
Returned call to Patient, no answer, left message to call back.    Shira Knight RN/ [facility]

## 2017-12-31 ENCOUNTER — RADIANT APPOINTMENT (OUTPATIENT)
Dept: MRI IMAGING | Facility: CLINIC | Age: 47
End: 2017-12-31
Attending: FAMILY MEDICINE
Payer: COMMERCIAL

## 2017-12-31 ENCOUNTER — RADIANT APPOINTMENT (OUTPATIENT)
Dept: MRI IMAGING | Facility: CLINIC | Age: 47
End: 2017-12-31
Attending: PSYCHIATRY & NEUROLOGY
Payer: COMMERCIAL

## 2017-12-31 DIAGNOSIS — R20.9 DISTURBANCE OF SKIN SENSATION: ICD-10-CM

## 2017-12-31 DIAGNOSIS — R20.9 SENSORY DISTURBANCE: ICD-10-CM

## 2017-12-31 RX ORDER — GADOBUTROL 604.72 MG/ML
7.5 INJECTION INTRAVENOUS ONCE
Status: COMPLETED | OUTPATIENT
Start: 2017-12-31 | End: 2017-12-31

## 2017-12-31 RX ADMIN — GADOBUTROL 7.5 ML: 604.72 INJECTION INTRAVENOUS at 14:25

## 2017-12-31 NOTE — DISCHARGE INSTRUCTIONS
MRI Contrast Discharge Instructions    The IV contrast you received today will pass out of your body in your  urine. This will happen in the next 24 hours. You will not feel this process.  Your urine will not change color.    Drink at least 4 extra glasses of water or juice today (unless your doctor  has restricted your fluids). This reduces the stress on your kidneys.  You may take your regular medicines.    If you are on dialysis: It is best to have dialysis today.    If you have a reaction: Most reactions happen right away. If you have  any new symptoms after leaving the hospital (such as hives or swelling),  call your hospital at the correct number below. Or call your family doctor.  If you have breathing distress or wheezing, call 911.    Special instructions: ***    I have read and understand the above information.    Signature:______________________________________ Date:___________    Staff:__________________________________________ Date:___________     Time:__________    [facility] Departments:    ___[place]: [phone]  ___[facility]: [phone]  ___[place]: [phone] ___[facility]: [phone]  ___[facility]: [phone]  ___[facility]: [phone]  ___[phone]  ___[facility]: [phone]  ___[phone]

## 2017-12-31 NOTE — DISCHARGE INSTRUCTIONS
MRI Contrast Discharge Instructions    The IV contrast you received today will pass out of your body in your  urine. This will happen in the next 24 hours. You will not feel this process.  Your urine will not change color.    Drink at least 4 extra glasses of water or juice today (unless your doctor  has restricted your fluids). This reduces the stress on your kidneys.  You may take your regular medicines.    If you are on dialysis: It is best to have dialysis today.    If you have a reaction: Most reactions happen right away. If you have  any new symptoms after leaving the hospital (such as hives or swelling),  call your hospital at the correct number below. Or call your family doctor.  If you have breathing distress or wheezing, call 911.    Special instructions: ***    I have read and understand the above information.    Signature:______________________________________ Date:___________    Staff:__________________________________________ Date:___________     Time:__________    [facility] Departments:    ___[place]: [phone]  ___[facility]: [phone]  ___[place]: [phone] ___[facility]: [phone]  ___[facility]: [phone]  ___[facility]: [phone]  ___[phone]  ___[facility]: [phone]  ___[phone]

## 2018-01-03 ENCOUNTER — TELEPHONE (OUTPATIENT)
Dept: NEUROLOGY | Facility: CLINIC | Age: 48
End: 2018-01-03

## 2018-01-03 DIAGNOSIS — R20.9 DISTURBANCE OF SKIN SENSATION: Primary | ICD-10-CM

## 2018-01-03 DIAGNOSIS — G37.9 DEMYELINATING DISEASE OF CENTRAL NERVOUS SYSTEM (H): ICD-10-CM

## 2018-01-03 NOTE — TELEPHONE ENCOUNTER
----- Message from Edi Buckley MD sent at 1/3/2018  9:21 AM CST -----  Regarding: Referral to Dr Lizarraga  Please help. Thanks DARIEL Buckley

## 2018-01-17 NOTE — PROGRESS NOTES
The patient first developed neurologic symptoms in October of 2017. At this time she began to experience sensory symptoms.  Prior to developing the abnormal sensation, she was doing yoga. She does not recall any specific injury.  When it started the abnormal sensation started in her hands and deet. However over the next few days, the numbness spread to her left lower leg, left trunk, and forearms. The sensation began to slowly improved over the following weeks. She was referred to Dr. Buckley, who saw her in 12/21/2017. At that time, she reports that she still had some numbness in those area, some clumsiness, and stiffness. Due to the concern of demyelinating disease, the patinet had a MRI that demonstrated  Lesions supiscious for demyelinating disease.

## 2018-01-28 ENCOUNTER — HEALTH MAINTENANCE LETTER (OUTPATIENT)
Age: 48
End: 2018-01-28

## 2018-02-07 ENCOUNTER — PRE VISIT (OUTPATIENT)
Dept: NEUROLOGY | Facility: CLINIC | Age: 48
End: 2018-02-07

## 2018-02-12 ENCOUNTER — OFFICE VISIT (OUTPATIENT)
Dept: NEUROLOGY | Facility: CLINIC | Age: 48
End: 2018-02-12
Payer: COMMERCIAL

## 2018-02-12 VITALS
BODY MASS INDEX: 27.4 KG/M2 | WEIGHT: 185 LBS | OXYGEN SATURATION: 100 % | DIASTOLIC BLOOD PRESSURE: 82 MMHG | TEMPERATURE: 97 F | HEART RATE: 74 BPM | SYSTOLIC BLOOD PRESSURE: 117 MMHG | HEIGHT: 69 IN

## 2018-02-12 DIAGNOSIS — G37.3 TRANSVERSE MYELITIS (H): Primary | ICD-10-CM

## 2018-02-12 PROCEDURE — 99213 OFFICE O/P EST LOW 20 MIN: CPT | Mod: GC | Performed by: PSYCHIATRY & NEUROLOGY

## 2018-02-12 RX ORDER — GLUCOSAMINE HCL 500 MG
TABLET ORAL
COMMUNITY
End: 2018-10-03

## 2018-02-12 NOTE — PROGRESS NOTES
Neurology Clinic - Multiple Sclerosis    Viki Woodard    47 year old       Reason for consult: Transverse myelitis - question of Multiple Sclerosis  Referral : Dr. Buckley          HPI:   46 yo F who in October noticed numbness in her feet that ascend through her legs up to her torso for one week. HEr hands also got numb.  She might got a little bit more clumsy, but she thinks that this was related to the numbness.  There was no other symptom. She denies change in her vision, gait, urinary control or bowel control. There was a little bit of pain in her L heel for some days. There is no history of trauma or falls. Her numbness greatly improved but it still affect both of her hands.     Due to her complains of numbness, Dr Buckley ordered MRI brain and cervical. Due to the fact that those showed WM lesions, she was referred by him to Dr. Lizarraga for discussion of possible MS diagnosis.     She remembers that one year and a half ago she felt that her right foot was rigid    For the last 3 years she has more fatigue. This was investigated and felt to be related to her hashimoto thyroiditis, diagnosed with a biopsy. As her hormone levels were normal she was never treated with medications                 Past Medical History:   Hashimoto Thyroiditis  Rosacea           Past Surgical History:     Past Surgical History:   Procedure Laterality Date     ADENOIDECTOMY  as teen     BREAST BIOPSY, RT/LT  2015    needle bx for abnormal mammogram     deviated septum  in early 20's    repaired      EYE SURGERY      Strabismus repair x 3     PE TUBES Bilateral as child              Social History:     Social History   Substance Use Topics     Smoking status: Never Smoker     Smokeless tobacco: Never Used     Alcohol use Yes      Comment: wine- 2-3 glasses every 2-4 weeks      Lives with family. She is not working at the moment. She is not thinking about having more kids, but she is any anticonceptional.            Family History:  "    Family History   Problem Relation Age of Onset     HEART DISEASE Father      cabg in 60's     Prostate Cancer Father    Mother: thyroid problems.     No neurological diseases in the family.            Allergies:     Allergies   Allergen Reactions     Codeine Nausea             Medications:     Current Outpatient Prescriptions   Medication Sig     Cholecalciferol (VITAMIN D3) 3000 UNITS TABS      multivitamin, therapeutic with minerals (MULTI-VITAMIN) TABS tablet Take 1 tablet by mouth daily     MetroNIDAZOLE (METROGEL EX) Externally apply topically every other day      No current facility-administered medications for this visit.               Review of Systems:   C: NEGATIVE for fever, chills, change in weight  CONSTITUTIONAL:NEGATIVE for fever, chills, change in weight  EYES: NEGATIVE for vision changes or irritation and diplopia  E/M: NEGATIVE for ear, mouth and throat problems  R: NEGATIVE for significant cough or SOB  CV: NEGATIVE for chest pain, palpitations or peripheral edema  GI: no change in bowel control  : no change in urine control. Not in menopause  MUSCULOSKELETAL: NEGATIVE for significant arthralgias or myalgia and \"bienvenido horses\"  NEURO: POSITIVE for numbness or tingling  PSYCHIATRIC:no change in mood          Physical Exam:   Vital signs:  Temp: 97  F (36.1  C) Temp src: Oral BP: 117/82 Pulse: 74     SpO2: 100 %     Height: 175.3 cm (5' 9\") Weight: 83.9 kg (185 lb)  Estimated body mass index is 27.32 kg/(m^2) as calculated from the following:    Height as of this encounter: 1.753 m (5' 9\").    Weight as of this encounter: 83.9 kg (185 lb).    Constitutional : tall, well-built patioent  Head: asymmetric, atraumatic, no tongue lesions.   Eyes: anicteric  CVC: rhythmic  LUng On room air. No respiratory distress.   Adomen:non tender  Extremities:  No edema. Well perfused  Psychiatry: in good mood. Collaborative  Neuroexam  Alert and oriented to place, date, self and reasons of being in the " doctors office.   Follow commands  Face asymmetric, congenital. R eye with different size than left one.   Eyes: motility preserved, no nystagmus  Tongue centered  No tremors  No dysmetria (arms and legs tested)  Strength preserved on  legs , proximally and distally. Arms strength is preserved proximally, she has mild decrease in finger extension in L hand (4/5) and in finger flexion (4+/5), otherwise preserved. No deficits in the right arm/hand.   Reflexes symmetric, preserved  Sensory exam to pinprick: preserved, including in hands.   Vibration: mildly decreased in toes.   No aphasia  No dysarthria  Gait: Mild difficulty in tandem, but can do it. Mild difficulty walking in toes with right foot, but can do it.             Data:   MRI brain:   2 small periventricular lesions that could represent  demyelination, but are nonspecific. No definite evidence for active  Demyelination.    MRI cervical spine.   2 focal lesions noted in the cord centered at about the level of the  C4 vertebral body. The smaller left lateral lesion demonstrates  enhancement suggesting active demyelination.         Assessment and Plan:   46 yo F with Hashimoto Thyroiditis comes to Clinic after cervical myelitis for investigation    Cervical myelitis: With Brain Lesion. The history, age and image are a somewhat atypical for MS, but could be seen in MS. Due to this fact, further investigation should be done. Differential diagnoses include, but are not limited to, MS, viral infections, other autoimmune diseases and NMO. The possible diagnosis were explained to the patient, as the need of more blood work and lumbar puncture. Although she understand the need of a lumbar puncture, she would prefer to do the blood investigation first, and pending the results, think about LP. She also needs to enter in contact with her insurance to know what is covered and what is not.   - vitamin levels,   - vasculitis and autoimmune panel  - varicella zoster, TB,  RADHA, HIV, hepatitis serology  - angiotensin levels  - Igs levels.        Attestation:  PAtient seen and discussed with Dr. Lizarraga      Sierra Moyata  PGY4 Neurology  [id number]    Date of Onset: 10/2017  Date of Diagnosis: NA  Initial Clinical Course: Monophasic  Current Clinical Course: Monophasic  Past Disease Modifying Therapy(ies): NA  Current Disease Modifying Therapy(ies): NA  Most Recent MRI of the Brain: 12/21/17  Most Recent MRI of the Cervical Cord 12/31/17  Most Recent MRI of the Thoracic Cord: NA  Most Recent Lumbar Puncture: NA  Most Recent OCT: NA  Most Recent JCV: NA  Most Recent Remote Hepatitis Panel: NA  Most Recent VZV IgG: NA   Most Recent TB Quant: NA    The patient first developed neurologic symptoms in October of 2017. At this time she began to experience sensory symptoms.  Prior to developing the abnormal sensation, she was doing yoga. She does not recall any specific injury.  When it started the abnormal sensation started in her hands and feet. However over the next few days, the numbness spread to her left lower leg, left trunk, and forearms. The sensation began to slowly improved over the following weeks. She was referred to Dr. Buckley, who saw her in 12/21/2017. At that time, she reports that she still had some numbness in those area, some clumsiness, and stiffness. Due to the concern of demyelinating disease, the patinet had a MRI that demonstrated  Lesions supiscious for demyelinating disease.      I saw and examined this patient, and have read and edited the resident's note.  I agree with the findings, assessment, and plan.    Miguel Lizarraga  Staff Neurologist   02/12/18

## 2018-02-12 NOTE — NURSING NOTE
"Viki S Vance's goals for this visit include: consult  She requests these members of her care team be copied on today's visit information:     PCP: Elise Shannon    Referring Provider:  Edi Buckley MD  [address] [place] [address]    Chief Complaint   Patient presents with     Consult     discuss bilateral palm nubness and abnormal MRI of the Brain       Initial /82  Pulse 74  Temp 97  F (36.1  C) (Oral)  Ht 1.753 m (5' 9\")  Wt 83.9 kg (185 lb)  SpO2 100%  BMI 27.32 kg/m2 Estimated body mass index is 27.32 kg/(m^2) as calculated from the following:    Height as of this encounter: 1.753 m (5' 9\").    Weight as of this encounter: 83.9 kg (185 lb).  Medication Reconciliation: complete    "

## 2018-02-12 NOTE — LETTER
2/12/2018         RE: Viki Woodard  [id number] [address] [place] [id number]        Dear Colleague,    Thank you for referring your patient, Viki Woodard, to the [facility]. Please see a copy of my visit note below.    Neurology Clinic - Multiple Sclerosis    Viki Woodard    47 year old       Reason for consult: Transverse myelitis - question of Multiple Sclerosis  Referral : Dr. Buckley          HPI:   48 yo F who in October noticed numbness in her feet that ascend through her legs up to her torso for one week. HEr hands also got numb.  She might got a little bit more clumsy, but she thinks that this was related to the numbness.  There was no other symptom. She denies change in her vision, gait, urinary control or bowel control. There was a little bit of pain in her L heel for some days. There is no history of trauma or falls. Her numbness greatly improved but it still affect both of her hands.     Due to her complains of numbness, Dr Buckley ordered MRI brain and cervical. Due to the fact that those showed WM lesions, she was referred by him to Dr. Lizarraga for discussion of possible MS diagnosis.     She remembers that one year and a half ago she felt that her right foot was rigid    For the last 3 years she has more fatigue. This was investigated and felt to be related to her hashimoto thyroiditis, diagnosed with a biopsy. As her hormone levels were normal she was never treated with medications                 Past Medical History:   Hashimoto Thyroiditis  Rosacea           Past Surgical History:     Past Surgical History:   Procedure Laterality Date     ADENOIDECTOMY  as teen     BREAST BIOPSY, RT/LT  2015    needle bx for abnormal mammogram     deviated septum  in early 20's    repaired      EYE SURGERY      Strabismus repair x 3     PE TUBES Bilateral as child              Social History:     Social History   Substance Use Topics     Smoking status: Never Smoker      "Smokeless tobacco: Never Used     Alcohol use Yes      Comment: wine- 2-3 glasses every 2-4 weeks      Lives with family. She is not working at the moment. She is not thinking about having more kids, but she is any anticonceptional.            Family History:     Family History   Problem Relation Age of Onset     HEART DISEASE Father      cabg in 60's     Prostate Cancer Father    Mother: thyroid problems.     No neurological diseases in the family.            Allergies:     Allergies   Allergen Reactions     Codeine Nausea             Medications:     Current Outpatient Prescriptions   Medication Sig     Cholecalciferol (VITAMIN D3) 3000 UNITS TABS      multivitamin, therapeutic with minerals (MULTI-VITAMIN) TABS tablet Take 1 tablet by mouth daily     MetroNIDAZOLE (METROGEL EX) Externally apply topically every other day      No current facility-administered medications for this visit.               Review of Systems:   C: NEGATIVE for fever, chills, change in weight  CONSTITUTIONAL:NEGATIVE for fever, chills, change in weight  EYES: NEGATIVE for vision changes or irritation and diplopia  E/M: NEGATIVE for ear, mouth and throat problems  R: NEGATIVE for significant cough or SOB  CV: NEGATIVE for chest pain, palpitations or peripheral edema  GI: no change in bowel control  : no change in urine control. Not in menopause  MUSCULOSKELETAL: NEGATIVE for significant arthralgias or myalgia and \"bienvenido horses\"  NEURO: POSITIVE for numbness or tingling  PSYCHIATRIC:no change in mood          Physical Exam:   Vital signs:  Temp: 97  F (36.1  C) Temp src: Oral BP: 117/82 Pulse: 74     SpO2: 100 %     Height: 175.3 cm (5' 9\") Weight: 83.9 kg (185 lb)  Estimated body mass index is 27.32 kg/(m^2) as calculated from the following:    Height as of this encounter: 1.753 m (5' 9\").    Weight as of this encounter: 83.9 kg (185 lb).    Constitutional : tall, well-built patioent  Head: asymmetric, atraumatic, no tongue lesions. "   Eyes: anicteric  CVC: rhythmic  LUng On room air. No respiratory distress.   Adomen:non tender  Extremities:  No edema. Well perfused  Psychiatry: in good mood. Collaborative  Neuroexam  Alert and oriented to place, date, self and reasons of being in the doctors office.   Follow commands  Face asymmetric, congenital. R eye with different size than left one.   Eyes: motility preserved, no nystagmus  Tongue centered  No tremors  No dysmetria (arms and legs tested)  Strength preserved on  legs , proximally and distally. Arms strength is preserved proximally, she has mild decrease in finger extension in L hand (4/5) and in finger flexion (4+/5), otherwise preserved. No deficits in the right arm/hand.   Reflexes symmetric, preserved  Sensory exam to pinprick: preserved, including in hands.   Vibration: mildly decreased in toes.   No aphasia  No dysarthria  Gait: Mild difficulty in tandem, but can do it. Mild difficulty walking in toes with right foot, but can do it.             Data:   MRI brain:   2 small periventricular lesions that could represent  demyelination, but are nonspecific. No definite evidence for active  Demyelination.    MRI cervical spine.   2 focal lesions noted in the cord centered at about the level of the  C4 vertebral body. The smaller left lateral lesion demonstrates  enhancement suggesting active demyelination.         Assessment and Plan:   46 yo F with Hashimoto Thyroiditis comes to Clinic after cervical myelitis for investigation    Cervical myelitis: With Brain Lesion. The history, age and image are a somewhat atypical for MS, but could be seen in MS. Due to this fact, further investigation should be done. Differential diagnoses include, but are not limited to, MS, viral infections, other autoimmune diseases and NMO. The possible diagnosis were explained to the patient, as the need of more blood work and lumbar puncture. Although she understand the need of a lumbar puncture, she would prefer  to do the blood investigation first, and pending the results, think about LP. She also needs to enter in contact with her insurance to know what is covered and what is not.   - vitamin levels,   - vasculitis and autoimmune panel  - varicella zoster, TB, RADHA, HIV, hepatitis serology  - angiotensin levels  - Igs levels.        Attestation:  PAtient seen and discussed with Dr. Zia Marshall  PGY4 Neurology  [id number]    Date of Onset: 10/2017  Date of Diagnosis: NA  Initial Clinical Course: Monophasic  Current Clinical Course: Monophasic  Past Disease Modifying Therapy(ies): NA  Current Disease Modifying Therapy(ies): NA  Most Recent MRI of the Brain: 12/21/17  Most Recent MRI of the Cervical Cord 12/31/17  Most Recent MRI of the Thoracic Cord: NA  Most Recent Lumbar Puncture: NA  Most Recent OCT: NA  Most Recent JCV: NA  Most Recent Remote Hepatitis Panel: NA  Most Recent VZV IgG: NA   Most Recent TB Quant: NA    The patient first developed neurologic symptoms in October of 2017. At this time she began to experience sensory symptoms.  Prior to developing the abnormal sensation, she was doing yoga. She does not recall any specific injury.  When it started the abnormal sensation started in her hands and feet. However over the next few days, the numbness spread to her left lower leg, left trunk, and forearms. The sensation began to slowly improved over the following weeks. She was referred to Dr. Buckley, who saw her in 12/21/2017. At that time, she reports that she still had some numbness in those area, some clumsiness, and stiffness. Due to the concern of demyelinating disease, the patinet had a MRI that demonstrated  Lesions supiscious for demyelinating disease.      I saw and examined this patient, and have read and edited the resident's note.  I agree with the findings, assessment, and plan.    Miguel Lizarraga  Staff Neurologist   02/12/18             Again, thank you for allowing me to participate in the  care of your patient.        Sincerely,        Miguel Lizarraga MD

## 2018-02-12 NOTE — MR AVS SNAPSHOT
After Visit Summary   2/12/2018    Viki Woodard    MRN: [id number]           Patient Information     Date Of Birth          1970        Visit Information        Provider Department      2/12/2018 8:00 AM Miguel Lizarraga MD [facility]        Today's Diagnoses     Transverse myelitis (H)    -  1      Care Instructions    Would get blood work    Would have you follow up three weeks after getting the blood work.    We will follow up in a month.          Follow-ups after your visit        Follow-up notes from your care team     Return in about 4 weeks (around 3/12/2018).      Your next 10 appointments already scheduled     Feb 19, 2018  8:00 AM CST   LAB with LAB FIRST FLOOR [facility] ([facility])    [address] [place] [id number]   [phone]           Please do not eat 10-12 hours before your appointment if you are coming in fasting for labs on lipids, cholesterol, or glucose (sugar). This does not apply to pregnant women. Water, hot tea and black coffee (with nothing added) are okay. Do not drink other fluids, diet soda or chew gum.            Mar 12, 2018  9:30 AM CDT   Return Visit with Miguel Lizarraga MD   [facility] ([facility]    [address] [place] [id number]   [phone]              Future tests that were ordered for you today     Open Future Orders        Priority Expected Expires Ordered    IgM Routine  2/12/2018 2/12/2018    RADHA Virus Ab Index Reflex Inhibition Routine  2/12/2018 2/12/2018    Lyme Disease Jason with reflex to WB Serum Routine  2/12/2018 2/12/2018    M Tuberculosis by Quantiferon Routine  2/12/2018 2/12/2018    Rheumatoid factor Routine  2/12/2018 2/12/2018    SSA Ro THALIA Antibody IgG Routine  2/12/2018 2/12/2018    SSB La THALIA Antibody IgG Routine  2/12/2018 2/12/2018    Tissue transglutaminase jason IgA and IgG Routine   2/12/2018 2/12/2018    TSH with free T4 reflex Routine  2/12/2018 2/12/2018    Varicella Zoster Virus Antibody IgG Routine  2/12/2018 2/12/2018    Vasculitis panel Routine  2/12/2018 2/12/2018    Vitamin B12 Routine  2/12/2018 2/12/2018    Vitamin B6 Routine  2/12/2018 2/12/2018    Vitamin D Deficiency Routine  2/12/2018 2/12/2018    Angiotensin converting enzyme Routine  2/12/2018 2/12/2018    Anti Nuclear Tammy IgG by IFA with Reflex Routine  2/12/2018 2/12/2018    CBC with platelets differential Routine  2/12/2018 2/12/2018    Comprehensive metabolic panel Routine  2/12/2018 2/12/2018    Copper level Routine  2/12/2018 2/12/2018    Folate Routine  2/12/2018 2/12/2018    Hepatitis B Surface Antibody Routine  2/12/2018 2/12/2018    Hepatitis B core antibody Routine  2/12/2018 2/12/2018    Hepatitis B surface antigen Routine  2/12/2018 2/12/2018    Hepatitis C RNA quantitative Routine  2/12/2018 2/12/2018    HIV Antigen Antibody Combo Routine  2/12/2018 2/12/2018    IgA Routine  2/12/2018 2/12/2018    IgG Routine  2/12/2018 2/12/2018    IgA Routine  2/12/2019 2/12/2018    IgM Routine  2/12/2019 2/12/2018    IgG Routine  2/12/2019 2/12/2018            Who to contact     If you have questions or need follow up information about today's clinic visit or your schedule please contact [facility] directly at [phone].  Normal or non-critical lab and imaging results will be communicated to you by MyChart, letter or phone within 4 business days after the clinic has received the results. If you do not hear from us within 7 days, please contact the clinic through MyChart or phone. If you have a critical or abnormal lab result, we will notify you by phone as soon as possible.  Submit refill requests through [employer] or call your pharmacy and they will forward the refill request to us. Please allow 3 business days for your refill to be completed.          Additional Information About Your Visit        [facility]  "Information     [employer] gives you secure access to your electronic health record. If you see a primary care provider, you can also send messages to your care team and make appointments. If you have questions, please call your primary care clinic.  If you do not have a primary care provider, please call [phone] and they will assist you.      [employer] is an electronic gateway that provides easy, online access to your medical records. With [employer], you can request a clinic appointment, read your test results, renew a prescription or communicate with your care team.     To access your existing account, please contact your [facility] or call [phone] for assistance.        Care EveryWhere ID     This is your Care EveryWhere ID. This could be used by other organizations to access your [facility] medical records  [id number]        Your Vitals Were     Pulse Temperature Height Pulse Oximetry BMI (Body Mass Index)       74 97  F (36.1  C) (Oral) 1.753 m (5' 9\") 100% 27.32 kg/m2        Blood Pressure from Last 3 Encounters:   02/12/18 117/82   12/21/17 119/82   11/06/17 118/74    Weight from Last 3 Encounters:   02/12/18 83.9 kg (185 lb)   12/21/17 84.8 kg (186 lb 14.4 oz)   11/06/17 83.9 kg (185 lb)              We Performed the Following     Angiotensin converting enzyme     Anti Nuclear Tammy IgG by IFA with Reflex     CBC with platelets differential     Comprehensive metabolic panel     Copper level     Folate     Hepatitis B core antibody     Hepatitis B Surface Antibody     Hepatitis B surface antigen     Hepatitis C RNA quantitative     HIV Antigen Antibody Combo     RADHA Virus Ab Index Reflex Inhibition     Lyme Disease Tammy with reflex to WB Serum     M Tuberculosis by Quantiferon     Drake Send out. Test ID: CSI1 Reporting Name: CNS Demyelinating Disease Interp, S: Laboratory Miscellaneous Order     Rheumatoid factor     SSA Ro THALIA Antibody IgG     SSB La THALIA Antibody IgG     Tissue " transglutaminase jason IgA and IgG     TSH with free T4 reflex     Varicella Zoster Virus Antibody IgG     Vasculitis panel     Vitamin B12     Vitamin B6     Vitamin D Deficiency        Primary Care Provider Office Phone # Fax #    Elise Shannon [phone] [address]  [facility] [address]        Equal Access to Services     JOSE ALEJANDRO GONZALES : Hadii aad ku hadasho Soomaali, waaxda luqadaha, qaybta kaalmada adeegyada, waxay idiin hayaan adeeg kharash la'aan . So [facility] [phone].    ATENCIÓN: Si habla español, tiene a kim disposición servicios gratuitos de asistencia lingüística. Llame al [phone].    We comply with applicable federal civil rights laws and [place] laws. We do not discriminate on the basis of race, color, national origin, age, disability, sex, sexual orientation, or gender identity.            Thank you!     Thank you for choosing [facility]  for your care. Our goal is always to provide you with excellent care. Hearing back from our patients is one way we can continue to improve our services. Please take a few minutes to complete the written survey that you may receive in the mail after your visit with us. Thank you!             Your Updated Medication List - Protect others around you: Learn how to safely use, store and throw away your medicines at www.disposemymeds.org.          This list is accurate as of 2/12/18  9:22 AM.  Always use your most recent med list.                   Brand Name Dispense Instructions for use Diagnosis    METROGEL EX      Externally apply topically every other day        Multi-vitamin Tabs tablet      Take 1 tablet by mouth daily        Vitamin D3 3000 UNITS Tabs

## 2018-02-12 NOTE — PATIENT INSTRUCTIONS
Would get blood work    Would have you follow up three weeks after getting the blood work.    We will follow up in a month.

## 2018-02-13 DIAGNOSIS — G37.3 TRANSVERSE MYELITIS (H): ICD-10-CM

## 2018-02-13 LAB
ALBUMIN SERPL-MCNC: 3.9 G/DL (ref 3.4–5)
ALP SERPL-CCNC: 49 U/L (ref 40–150)
ALT SERPL W P-5'-P-CCNC: 36 U/L (ref 0–50)
ANION GAP SERPL CALCULATED.3IONS-SCNC: 6 MMOL/L (ref 3–14)
AST SERPL W P-5'-P-CCNC: 20 U/L (ref 0–45)
BASOPHILS # BLD AUTO: 0 10E9/L (ref 0–0.2)
BASOPHILS NFR BLD AUTO: 0.5 %
BILIRUB SERPL-MCNC: 0.5 MG/DL (ref 0.2–1.3)
BUN SERPL-MCNC: 8 MG/DL (ref 7–30)
CALCIUM SERPL-MCNC: 9 MG/DL (ref 8.5–10.1)
CHLORIDE SERPL-SCNC: 109 MMOL/L (ref 94–109)
CO2 SERPL-SCNC: 26 MMOL/L (ref 20–32)
CREAT SERPL-MCNC: 0.6 MG/DL (ref 0.52–1.04)
DEPRECATED CALCIDIOL+CALCIFEROL SERPL-MC: 16 UG/L (ref 20–75)
DIFFERENTIAL METHOD BLD: NORMAL
ENA SS-A IGG SER IA-ACNC: <0.2 AI (ref 0–0.9)
ENA SS-B IGG SER IA-ACNC: <0.2 AI (ref 0–0.9)
EOSINOPHIL # BLD AUTO: 0.1 10E9/L (ref 0–0.7)
EOSINOPHIL NFR BLD AUTO: 2.1 %
ERYTHROCYTE [DISTWIDTH] IN BLOOD BY AUTOMATED COUNT: 13 % (ref 10–15)
FOLATE SERPL-MCNC: 33.5 NG/ML
GFR SERPL CREATININE-BSD FRML MDRD: >90 ML/MIN/1.7M2
GLUCOSE SERPL-MCNC: 89 MG/DL (ref 70–99)
HBV CORE AB SERPL QL IA: NONREACTIVE
HBV SURFACE AB SERPL IA-ACNC: 1.41 M[IU]/ML
HBV SURFACE AG SERPL QL IA: NONREACTIVE
HCT VFR BLD AUTO: 38.7 % (ref 35–47)
HGB BLD-MCNC: 12.4 G/DL (ref 11.7–15.7)
HIV 1+2 AB+HIV1 P24 AG SERPL QL IA: NONREACTIVE
IGA SERPL-MCNC: 186 MG/DL (ref 70–380)
IGG SERPL-MCNC: 1170 MG/DL (ref 695–1620)
IGM SERPL-MCNC: 194 MG/DL (ref 60–265)
IMM GRANULOCYTES # BLD: 0 10E9/L (ref 0–0.4)
IMM GRANULOCYTES NFR BLD: 0.2 %
LYMPHOCYTES # BLD AUTO: 2.3 10E9/L (ref 0.8–5.3)
LYMPHOCYTES NFR BLD AUTO: 40.1 %
MCH RBC QN AUTO: 28.2 PG (ref 26.5–33)
MCHC RBC AUTO-ENTMCNC: 32 G/DL (ref 31.5–36.5)
MCV RBC AUTO: 88 FL (ref 78–100)
MISCELLANEOUS TEST: NORMAL
MONOCYTES # BLD AUTO: 0.3 10E9/L (ref 0–1.3)
MONOCYTES NFR BLD AUTO: 5.2 %
NEUTROPHILS # BLD AUTO: 3 10E9/L (ref 1.6–8.3)
NEUTROPHILS NFR BLD AUTO: 51.9 %
PLATELET # BLD AUTO: 232 10E9/L (ref 150–450)
POTASSIUM SERPL-SCNC: 4 MMOL/L (ref 3.4–5.3)
PROT SERPL-MCNC: 7.8 G/DL (ref 6.8–8.8)
RBC # BLD AUTO: 4.39 10E12/L (ref 3.8–5.2)
RHEUMATOID FACT SER NEPH-ACNC: <20 IU/ML (ref 0–20)
SODIUM SERPL-SCNC: 141 MMOL/L (ref 133–144)
TSH SERPL DL<=0.005 MIU/L-ACNC: 3.52 MU/L (ref 0.4–4)
VIT B12 SERPL-MCNC: 559 PG/ML (ref 193–986)
WBC # BLD AUTO: 5.8 10E9/L (ref 4–11)

## 2018-02-13 PROCEDURE — 83516 IMMUNOASSAY NONANTIBODY: CPT | Mod: 59 | Performed by: PSYCHIATRY & NEUROLOGY

## 2018-02-13 PROCEDURE — 87522 HEPATITIS C REVRS TRNSCRPJ: CPT | Performed by: PSYCHIATRY & NEUROLOGY

## 2018-02-13 PROCEDURE — 86787 VARICELLA-ZOSTER ANTIBODY: CPT | Performed by: PSYCHIATRY & NEUROLOGY

## 2018-02-13 PROCEDURE — 83876 ASSAY MYELOPEROXIDASE: CPT | Performed by: PSYCHIATRY & NEUROLOGY

## 2018-02-13 PROCEDURE — 87340 HEPATITIS B SURFACE AG IA: CPT | Performed by: PSYCHIATRY & NEUROLOGY

## 2018-02-13 PROCEDURE — 83516 IMMUNOASSAY NONANTIBODY: CPT | Performed by: PSYCHIATRY & NEUROLOGY

## 2018-02-13 PROCEDURE — 86704 HEP B CORE ANTIBODY TOTAL: CPT | Performed by: PSYCHIATRY & NEUROLOGY

## 2018-02-13 PROCEDURE — 86431 RHEUMATOID FACTOR QUANT: CPT | Performed by: PSYCHIATRY & NEUROLOGY

## 2018-02-13 PROCEDURE — 82525 ASSAY OF COPPER: CPT | Mod: 90 | Performed by: PSYCHIATRY & NEUROLOGY

## 2018-02-13 PROCEDURE — 82746 ASSAY OF FOLIC ACID SERUM: CPT | Performed by: PSYCHIATRY & NEUROLOGY

## 2018-02-13 PROCEDURE — 82306 VITAMIN D 25 HYDROXY: CPT | Performed by: PSYCHIATRY & NEUROLOGY

## 2018-02-13 PROCEDURE — 40000975 ZZHCL STATISTIC JC VIR AB INDEX INHIB: Mod: 90 | Performed by: PSYCHIATRY & NEUROLOGY

## 2018-02-13 PROCEDURE — 86255 FLUORESCENT ANTIBODY SCREEN: CPT | Mod: 90 | Performed by: PSYCHIATRY & NEUROLOGY

## 2018-02-13 PROCEDURE — 87389 HIV-1 AG W/HIV-1&-2 AB AG IA: CPT | Performed by: PSYCHIATRY & NEUROLOGY

## 2018-02-13 PROCEDURE — 80050 GENERAL HEALTH PANEL: CPT | Performed by: PSYCHIATRY & NEUROLOGY

## 2018-02-13 PROCEDURE — 86618 LYME DISEASE ANTIBODY: CPT | Performed by: PSYCHIATRY & NEUROLOGY

## 2018-02-13 PROCEDURE — 86235 NUCLEAR ANTIGEN ANTIBODY: CPT | Performed by: PSYCHIATRY & NEUROLOGY

## 2018-02-13 PROCEDURE — 99000 SPECIMEN HANDLING OFFICE-LAB: CPT | Performed by: PSYCHIATRY & NEUROLOGY

## 2018-02-13 PROCEDURE — 82164 ANGIOTENSIN I ENZYME TEST: CPT | Mod: 90 | Performed by: PSYCHIATRY & NEUROLOGY

## 2018-02-13 PROCEDURE — 86480 TB TEST CELL IMMUN MEASURE: CPT | Performed by: PSYCHIATRY & NEUROLOGY

## 2018-02-13 PROCEDURE — 82784 ASSAY IGA/IGD/IGG/IGM EACH: CPT | Performed by: PSYCHIATRY & NEUROLOGY

## 2018-02-13 PROCEDURE — 82607 VITAMIN B-12: CPT | Performed by: PSYCHIATRY & NEUROLOGY

## 2018-02-13 PROCEDURE — 86706 HEP B SURFACE ANTIBODY: CPT | Performed by: PSYCHIATRY & NEUROLOGY

## 2018-02-13 PROCEDURE — 84207 ASSAY OF VITAMIN B-6: CPT | Mod: 90 | Performed by: PSYCHIATRY & NEUROLOGY

## 2018-02-13 PROCEDURE — 86038 ANTINUCLEAR ANTIBODIES: CPT | Performed by: PSYCHIATRY & NEUROLOGY

## 2018-02-13 PROCEDURE — 36415 COLL VENOUS BLD VENIPUNCTURE: CPT | Performed by: PSYCHIATRY & NEUROLOGY

## 2018-02-14 LAB
ACE SERPL-CCNC: 19 U/L (ref 9–67)
ANA SER QL IF: NEGATIVE
B BURGDOR IGG+IGM SER QL: 0.21 (ref 0–0.89)
COPPER SERPL-MCNC: 101 UG/DL (ref 80–155)
MYELOPEROXIDASE AB SER-ACNC: <0.2 AI (ref 0–0.9)
PROTEINASE3 IGG SER-ACNC: <0.2 AI (ref 0–0.9)
TTG IGA SER-ACNC: 1 U/ML
TTG IGG SER-ACNC: <1 U/ML
VZV IGG SER QL IA: 4 AI (ref 0–0.8)

## 2018-02-15 LAB
HCV RNA SERPL NAA+PROBE-ACNC: NORMAL [IU]/ML
HCV RNA SERPL NAA+PROBE-LOG IU: NORMAL LOG IU/ML
M TB TUBERC IFN-G BLD QL: NEGATIVE
M TB TUBERC IFN-G/MITOGEN IGNF BLD: 0 IU/ML
VIT B6 SERPL-MCNC: 177.3 NMOL/L (ref 20–125)

## 2018-02-19 LAB — LAB SCANNED RESULT: ABNORMAL

## 2018-02-27 LAB
RESULT: NORMAL
SEND OUTS MISC TEST CODE: NORMAL
SEND OUTS MISC TEST SPECIMEN: NORMAL
TEST NAME: NORMAL

## 2018-05-03 ENCOUNTER — TELEPHONE (OUTPATIENT)
Dept: FAMILY MEDICINE | Facility: CLINIC | Age: 48
End: 2018-05-03

## 2018-05-03 NOTE — TELEPHONE ENCOUNTER
Panel Management Review        Last Office Visit with this department: 12/14/2017    Fail List measure: PAP      Patient is due/failing the following:   PAP    Action needed:   Patient needs office visit for PAP.    Type of outreach:    Sent [employer] message.    Questions for provider review:    None                                                                                                                                    Jazz Canchola MA

## 2018-06-16 ENCOUNTER — TELEPHONE (OUTPATIENT)
Dept: FAMILY MEDICINE | Facility: CLINIC | Age: 48
End: 2018-06-16

## 2018-06-20 ENCOUNTER — DOCUMENTATION ONLY (OUTPATIENT)
Dept: LAB | Facility: CLINIC | Age: 48
End: 2018-06-20

## 2018-06-20 NOTE — PROGRESS NOTES
This patient has overdue labs. A letter was sent on 5/15/2018 and there has been no lab appointment made. If you still want these labs done, please have your care team contact the patient to make a lab appointment. Otherwise, please have the labs discontinued and close the encounter.    Thank you,  [facility]

## 2018-10-01 ENCOUNTER — TRANSFERRED RECORDS (OUTPATIENT)
Dept: HEALTH INFORMATION MANAGEMENT | Facility: CLINIC | Age: 48
End: 2018-10-01

## 2018-10-01 LAB — PAP SMEAR - HIM PATIENT REPORTED: NEGATIVE

## 2018-10-03 ENCOUNTER — OFFICE VISIT (OUTPATIENT)
Dept: FAMILY MEDICINE | Facility: CLINIC | Age: 48
End: 2018-10-03
Payer: COMMERCIAL

## 2018-10-03 VITALS
HEART RATE: 91 BPM | WEIGHT: 183.4 LBS | OXYGEN SATURATION: 97 % | RESPIRATION RATE: 20 BRPM | HEIGHT: 69 IN | DIASTOLIC BLOOD PRESSURE: 82 MMHG | TEMPERATURE: 98.6 F | BODY MASS INDEX: 27.16 KG/M2 | SYSTOLIC BLOOD PRESSURE: 112 MMHG

## 2018-10-03 DIAGNOSIS — R07.0 THROAT PAIN: Primary | ICD-10-CM

## 2018-10-03 LAB
DEPRECATED S PYO AG THROAT QL EIA: NORMAL
SPECIMEN SOURCE: NORMAL

## 2018-10-03 PROCEDURE — 87880 STREP A ASSAY W/OPTIC: CPT | Performed by: NURSE PRACTITIONER

## 2018-10-03 PROCEDURE — 99213 OFFICE O/P EST LOW 20 MIN: CPT | Performed by: NURSE PRACTITIONER

## 2018-10-03 PROCEDURE — 87081 CULTURE SCREEN ONLY: CPT | Performed by: NURSE PRACTITIONER

## 2018-10-03 RX ORDER — AMOXICILLIN 500 MG/1
500 CAPSULE ORAL 3 TIMES DAILY
Qty: 21 CAPSULE | Refills: 0 | Status: SHIPPED | OUTPATIENT
Start: 2018-10-03 | End: 2019-04-04

## 2018-10-03 ASSESSMENT — PAIN SCALES - GENERAL: PAINLEVEL: EXTREME PAIN (9)

## 2018-10-03 NOTE — PROGRESS NOTES
SUBJECTIVE:   Viki Woodard is a 48 year old female who presents to clinic today for the following health issues:      Acute Illness   Acute illness concerns: Sore throat  Onset: Sunday    Fever: YES    Chills/Sweats: YES    Headache (location?): YES    Sinus Pressure:no    Conjunctivitis:  no    Ear Pain: no    Rhinorrhea: YES    Congestion: no    Sore Throat: YES     Cough: no    Wheeze: no    Decreased Appetite: YES    Nausea: YES    Vomiting: no    Diarrhea:  no    Dysuria/Freq.: no    Fatigue/Achiness: YES    Sick/Strep Exposure: daughter on antibiotics for sinus infection     Therapies Tried and outcome: cough drops but that does not help. Advil but that does not help    She states her throat is very sore, very difficult to swallow.  She has not slept well the past couple of days because of it.  She is slightly tearful.  She states she has not been on antibiotics for about 20 years, last time was when she was in her 20s and she states amoxicillin did not work for her.    We talked about the pros and cons of prescribing antibiotic with the strep culture being negative.  Discussed that unsure what we are truly treating with the prescription of an antibiotic, albeit there are some bacterial infections that we are not testing for that she could potentially have causing the sore throat.  Typically we talked about that it is viruses that cause sore throat and it should improve within 7-10 days.  She really did seem to want antibiotics so we will trial one.      Problem list and histories reviewed & adjusted, as indicated.  Additional history: as documented    Patient Active Problem List   Diagnosis     H/O eye muscle disorder     Rosacea     Low ferritin     Abnormal serum protein test     Abnormal thyroid stimulating hormone (TSH) level     Anxiety     Disturbance of skin sensation     Past Surgical History:   Procedure Laterality Date     ADENOIDECTOMY  as teen     BREAST BIOPSY, RT/LT  2015    needle bx for  "abnormal mammogram     deviated septum  in early 20's    repaired      EYE SURGERY      Strabismus repair x 3     PE TUBES Bilateral as child       Social History   Substance Use Topics     Smoking status: Never Smoker     Smokeless tobacco: Never Used     Alcohol use Yes      Comment: wine- 2-3 glasses every 2-4 weeks      Family History   Problem Relation Age of Onset     HEART DISEASE Father      cabg in 60's     Prostate Cancer Father          Current Outpatient Prescriptions   Medication Sig Dispense Refill     amoxicillin (AMOXIL) 500 MG capsule Take 1 capsule (500 mg) by mouth 3 times daily for 7 days 21 capsule 0     MetroNIDAZOLE (METROGEL EX) Externally apply topically every other day        multivitamin, therapeutic with minerals (MULTI-VITAMIN) TABS tablet Take 1 tablet by mouth daily       Allergies   Allergen Reactions     Codeine Nausea       Reviewed and updated as needed this visit by clinical staff  Tobacco  Allergies  Meds  Problems  Med Hx  Surg Hx  Fam Hx  Soc Hx        Reviewed and updated as needed this visit by Provider  Allergies  Meds  Problems         ROS:  Constitutional, HEENT, cardiovascular, pulmonary, gi and gu systems are negative, except as otherwise noted.    OBJECTIVE:     /82 (BP Location: Right arm, Patient Position: Sitting, Cuff Size: Adult Regular)  Pulse 91  Temp 98.6  F (37  C) (Oral)  Resp 20  Ht 1.753 m (5' 9\")  Wt 83.2 kg (183 lb 6.4 oz)  LMP 09/17/2018  SpO2 97%  BMI 27.08 kg/m2  Body mass index is 27.08 kg/(m^2).  GENERAL: ill appearing, alert and no distress  EYES: Eyes grossly normal to inspection-slightly red around the rims, PERRL and conjunctivae and sclerae normal  HENT: ear canals and TM's normal, nose and mouth without ulcers or lesions, posterior pharynx red, and slightly swollen,  no exudate noted   NECK: no adenopathy, no asymmetry, masses, or scars and thyroid normal to palpation-neck slightly tender  RESP: lungs clear to " auscultation - no rales, rhonchi or wheezes  CV: regular rate and rhythm, normal S1 S2, no S3 or S4, no murmur, click or rub  MS: no gross musculoskeletal defects noted, no edema    Diagnostic Test Results:  Results for orders placed or performed in visit on 10/03/18 (from the past 24 hour(s))   Strep, Rapid Screen   Result Value Ref Range    Specimen Description Throat     Rapid Strep A Screen       NEGATIVE: No Group A streptococcal antigen detected by immunoassay, await culture report.       ASSESSMENT/PLAN:         1. Throat pain  Very difficult to swallow.  Will start amoxicillin.  She reports this did not work when she was 21.  We talked about it has been a very long time since she has been on antibiotics, penicillin is a recommended treatment for strep throat.  Considering that it may come back positive for culture, will use amoxicillin for now.  If no improvement in 2 days then send provider message and can change antibiotics.  Again we talked about using an antibiotic when there is any clear reason to, she is aware that this may or may not help.  - Strep, Rapid Screen  - Beta strep group A culture  - amoxicillin (AMOXIL) 500 MG capsule; Take 1 capsule (500 mg) by mouth 3 times daily for 7 days  Dispense: 21 capsule; Refill: 0    FUTURE APPOINTMENTS:       - Follow-up visit in few days if 0 improvement in symptoms    MINESH Prescott, NP-C  [facility]

## 2018-10-04 LAB
BACTERIA SPEC CULT: NORMAL
SPECIMEN SOURCE: NORMAL

## 2018-10-15 ENCOUNTER — TELEPHONE (OUTPATIENT)
Dept: FAMILY MEDICINE | Facility: CLINIC | Age: 48
End: 2018-10-15

## 2018-10-15 NOTE — TELEPHONE ENCOUNTER
Panel Management Review        Last Office Visit with this department: 10/3/2018    Fail List measure: PAP      Patient is due/failing the following:   PAP    Action needed:   Patient needs office visit for Physical.    Type of outreach:    Sent Fon message.    Questions for provider review:    None                                                                                                                                    Jazz Canchola MA

## 2019-03-08 ENCOUNTER — TELEPHONE (OUTPATIENT)
Dept: FAMILY MEDICINE | Facility: CLINIC | Age: 49
End: 2019-03-08

## 2019-03-08 NOTE — TELEPHONE ENCOUNTER
Panel Management Review   One phone call and send letter if unable to reach them or MyChart message and send letter if not read after 2 weeks (You will get a message to your inbasket)        Last Office Visit with this department: 10/15/2018    Fail List measure: PAP      Patient is due/failing the following:   PAP    Action needed:   Patient needs office visit for Physical.    Type of outreach:    Sent [employer] message.    Questions for provider review:    None                                                                                                                                    Jazz Canchola MA

## 2019-04-04 ENCOUNTER — ANCILLARY PROCEDURE (OUTPATIENT)
Dept: GENERAL RADIOLOGY | Facility: CLINIC | Age: 49
End: 2019-04-04
Attending: FAMILY MEDICINE
Payer: COMMERCIAL

## 2019-04-04 ENCOUNTER — OFFICE VISIT (OUTPATIENT)
Dept: FAMILY MEDICINE | Facility: CLINIC | Age: 49
End: 2019-04-04
Payer: COMMERCIAL

## 2019-04-04 VITALS
BODY MASS INDEX: 28.33 KG/M2 | OXYGEN SATURATION: 98 % | SYSTOLIC BLOOD PRESSURE: 118 MMHG | DIASTOLIC BLOOD PRESSURE: 76 MMHG | HEART RATE: 95 BPM | WEIGHT: 191.3 LBS | TEMPERATURE: 98.8 F | HEIGHT: 69 IN

## 2019-04-04 DIAGNOSIS — S22.42XA CLOSED FRACTURE OF MULTIPLE RIBS OF LEFT SIDE, INITIAL ENCOUNTER: ICD-10-CM

## 2019-04-04 DIAGNOSIS — R07.81 RIB PAIN ON LEFT SIDE: ICD-10-CM

## 2019-04-04 DIAGNOSIS — R07.81 RIB PAIN ON LEFT SIDE: Primary | ICD-10-CM

## 2019-04-04 DIAGNOSIS — Z13.820 SCREENING FOR OSTEOPOROSIS: ICD-10-CM

## 2019-04-04 PROCEDURE — 71101 X-RAY EXAM UNILAT RIBS/CHEST: CPT | Mod: LT

## 2019-04-04 PROCEDURE — 99214 OFFICE O/P EST MOD 30 MIN: CPT | Performed by: FAMILY MEDICINE

## 2019-04-04 ASSESSMENT — PAIN SCALES - GENERAL: PAINLEVEL: SEVERE PAIN (6)

## 2019-04-04 ASSESSMENT — MIFFLIN-ST. JEOR: SCORE: 1567.11

## 2019-04-04 NOTE — Clinical Note
Please abstract the following data from this visit with this patient into the appropriate field in Epic:Pap smear done on this date: 10/2018 (approximately), by this group: [facility], results were negative. HPV negative

## 2019-04-04 NOTE — PATIENT INSTRUCTIONS
Try Tylenol to see if you get similar relief to ibuprofen. Take two in the morning and two at night, and two more during the day as needed.     I will put in an order for a bone density scan (DEXA) but check with your insurance to make sure it will be covered since you are premenopausal. If you do schedule it, please call [facility] (formerly called [facility]) at [phone] to schedule.

## 2019-04-04 NOTE — PROGRESS NOTES
SUBJECTIVE:   Viki Woodard is a 48 year old female who presents to clinic today for the following health issues:    Rib Pain    Onset: 1 week ago    Description:   Location: left rib cage/chest  Character: Sharp    Intensity: moderate    Progression of Symptoms: intermittent    Accompanying Signs & Symptoms:  Other symptoms: none    History:   Previous similar pain: no       Precipitating factors:   Trauma or overuse: YES, was on vacation in [place] last week. Tripped on an uneven pavement and landed on the left side. Ribs have been very sore and painful. Hurts taking in deep breathes and sneezing, or with movements. Has been taking Aleve to help with the pain.     Alleviating factors:  Improved by: nothing    Therapies Tried and outcome: Aleve, somewhat taking the edge off.     Patient fell one week ago but just returned from her trip last night so came in today. She has pain in her left ribs that radiates to center of chest. Her pain has been improving but is not resolved. It is manageable with ibuprofen and she does not want anything stronger. She has pain at rest, but it is worse when she sneezes. She is still able to walk normally. She denies SOB.    Problem list and histories reviewed & adjusted, as indicated.  Additional history: as documented    Patient Active Problem List   Diagnosis     H/O eye muscle disorder     Rosacea     Low ferritin     Abnormal serum protein test     Abnormal thyroid stimulating hormone (TSH) level     Anxiety     Disturbance of skin sensation     Past Surgical History:   Procedure Laterality Date     ADENOIDECTOMY  as teen     BREAST BIOPSY, RT/LT  2015    needle bx for abnormal mammogram     deviated septum  in early 20's    repaired      EYE SURGERY      Strabismus repair x 3     PE TUBES Bilateral as child       Social History     Tobacco Use     Smoking status: Never Smoker     Smokeless tobacco: Never Used   Substance Use Topics     Alcohol use: Yes     Comment: wine- 2-3  "glasses every 2-4 weeks      Family History   Problem Relation Age of Onset     Heart Disease Father         cabg in 60's     Prostate Cancer Father      Breast Cancer Mother 80        DCIS stage 1 - HER positive         Current Outpatient Medications   Medication Sig Dispense Refill     MetroNIDAZOLE (METROGEL EX) Externally apply topically every other day        multivitamin, therapeutic with minerals (MULTI-VITAMIN) TABS tablet Take 1 tablet by mouth daily       Allergies   Allergen Reactions     Codeine Nausea     Recent Labs   Lab Test 02/13/18  0800 11/20/17  0829 11/06/17  0852   LDL  --   --  139*   HDL  --   --  57   TRIG  --   --  267*   ALT 36 31 34   CR 0.60  --  0.64   GFRESTIMATED >90  --  >90   GFRESTBLACK >90  --  >90   POTASSIUM 4.0  --  4.1   TSH 3.52 3.07 4.75*      BP Readings from Last 3 Encounters:   04/04/19 118/76   10/03/18 112/82   02/12/18 117/82    Wt Readings from Last 3 Encounters:   04/04/19 86.8 kg (191 lb 4.8 oz)   10/03/18 83.2 kg (183 lb 6.4 oz)   02/12/18 83.9 kg (185 lb)                  Labs reviewed in EPIC    Reviewed and updated as needed this visit by clinical staff  Tobacco  Allergies  Meds  Med Hx  Surg Hx  Fam Hx  Soc Hx      Reviewed and updated as needed this visit by Provider  Tobacco  Allergies  Meds  Med Hx  Surg Hx  Fam Hx  Soc Hx        ROS:  Constitutional, HEENT, cardiovascular, pulmonary, gi and gu systems are negative, except as otherwise noted.    This document serves as a record of the services and decisions personally performed by REY SANDERS It was created on his/her behalf by Misael Barahona, a trained medical scribe. The creation of this document is based on the provider's statements to the medical scribe. Misael Barahona, April 4, 2019 2:17 PM  OBJECTIVE:     /76 (BP Location: Right arm, Patient Position: Chair, Cuff Size: Adult Regular)   Pulse 95   Temp 98.8  F (37.1  C) (Oral)   Ht 1.753 m (5' 9\")   Wt 86.8 kg (191 lb 4.8 oz)  "  SpO2 98%   Breastfeeding? No   BMI 28.25 kg/m    Body mass index is 28.25 kg/m .  GENERAL: healthy, alert and no distress  NECK: no adenopathy, no asymmetry, masses, or scars and thyroid normal to palpation  RESP: lungs clear to auscultation - no rales, rhonchi or wheezes  CV: regular rate and rhythm, normal S1 S2, no S3 or S4, no murmur, click or rub, no peripheral edema and peripheral pulses strong  ABDOMEN: soft, nontender, no hepatosplenomegaly, no masses and bowel sounds normal  MS: tender along midclavicular line left rib beneath breast, no crepitus.   PSYCH: mentation appears normal, affect normal/bright    XR ribs and chest left:  Xray personally reviewed and evaluated by me - fractures mildly displaced 5th and 6th ribs.  ASSESSMENT/PLAN:     1. Rib pain on left side  2. Closed fracture of multiple ribs of left side, initial encounter  Reviewed management of pain with ibuprofen and tylenol. Patient declined stronger pain medication   - XR Ribs & Chest Left G/E 3 Views; Future  - DX Hip/Pelvis/Spine; Future    3. Screening for osteoporosis  DEXA scan due to concern about bone density because of two rib fractures in premenopausal patient   - DX Hip/Pelvis/Spine; Future    Patient Instructions   Try Tylenol to see if you get similar relief to ibuprofen. Take two in the morning and two at night, and two more during the day as needed.     I will put in an order for a bone density scan but check with your insurance to make sure it will be covered since you are premenopausal. If you do schedule it, please call [facility] (formerly called [facility]) at [phone] to schedule.         The information in this document, created by the medical scribe for me, accurately reflects the services I personally performed and the decisions made by me. I have reviewed and approved this document for accuracy.   Christy Joe MD  [facility]

## 2020-03-11 ENCOUNTER — HEALTH MAINTENANCE LETTER (OUTPATIENT)
Age: 50
End: 2020-03-11

## 2020-12-27 ENCOUNTER — HEALTH MAINTENANCE LETTER (OUTPATIENT)
Age: 50
End: 2020-12-27

## 2021-03-06 ENCOUNTER — HEALTH MAINTENANCE LETTER (OUTPATIENT)
Age: 51
End: 2021-03-06

## 2021-04-05 ENCOUNTER — OFFICE VISIT (OUTPATIENT)
Dept: FAMILY MEDICINE | Facility: CLINIC | Age: 51
End: 2021-04-05
Payer: COMMERCIAL

## 2021-04-05 VITALS
SYSTOLIC BLOOD PRESSURE: 132 MMHG | TEMPERATURE: 99.1 F | BODY MASS INDEX: 28.44 KG/M2 | HEIGHT: 69 IN | OXYGEN SATURATION: 97 % | WEIGHT: 192 LBS | HEART RATE: 91 BPM | RESPIRATION RATE: 16 BRPM | DIASTOLIC BLOOD PRESSURE: 86 MMHG

## 2021-04-05 DIAGNOSIS — R10.32 LEFT LOWER QUADRANT PAIN: Primary | ICD-10-CM

## 2021-04-05 LAB — TSH SERPL DL<=0.005 MIU/L-ACNC: 3.53 MU/L (ref 0.4–4)

## 2021-04-05 PROCEDURE — 86003 ALLG SPEC IGE CRUDE XTRC EA: CPT | Performed by: FAMILY MEDICINE

## 2021-04-05 PROCEDURE — 83516 IMMUNOASSAY NONANTIBODY: CPT | Performed by: FAMILY MEDICINE

## 2021-04-05 PROCEDURE — 36415 COLL VENOUS BLD VENIPUNCTURE: CPT | Performed by: FAMILY MEDICINE

## 2021-04-05 PROCEDURE — 82785 ASSAY OF IGE: CPT | Performed by: FAMILY MEDICINE

## 2021-04-05 PROCEDURE — 99214 OFFICE O/P EST MOD 30 MIN: CPT | Performed by: FAMILY MEDICINE

## 2021-04-05 PROCEDURE — 84443 ASSAY THYROID STIM HORMONE: CPT | Performed by: FAMILY MEDICINE

## 2021-04-05 ASSESSMENT — ENCOUNTER SYMPTOMS
ARTHRALGIAS: 0
DIARRHEA: 0
BELCHING: 0
ANOREXIA: 0
DYSURIA: 0
CONSTIPATION: 0
ABDOMINAL PAIN: 1

## 2021-04-05 ASSESSMENT — MIFFLIN-ST. JEOR: SCORE: 1560.54

## 2021-04-05 ASSESSMENT — PAIN SCALES - GENERAL: PAINLEVEL: SEVERE PAIN (6)

## 2021-04-05 NOTE — PROGRESS NOTES
"Assessment & Plan     Left lower quadrant pain  50-year-old female with approximately 1 week history of left lower quadrant abdominal discomfort.  She has had this occurred twice in the past 5 years, she is wondering if she is allergic to certain foods.  She denies being pregnant, last menstrual period was 1 week ago, she declined hCG testing.  Does have a history of Hashimoto's, so we will check TSH.  She will turn 51 next week, obtaining colonoscopy.  Her symptoms may likely be related to diverticulosis.  She will follow-up with her primary care provider after colonoscopy or sooner if any worsening.  - Allergy adult food panel  - Tissue transglutaminase jason IgA and IgG  - TSH with free T4 reflex  - GASTROENTEROLOGY ADULT REF PROCEDURE ONLY; Future       BMI:   Estimated body mass index is 28.34 kg/m  as calculated from the following:    Height as of this encounter: 1.753 m (5' 9.02\").    Weight as of this encounter: 87.1 kg (192 lb).         Return in about 4 weeks (around 5/3/2021) for Annual Well Check.    Johnathan Vega MD  [facility] JORGE Woodard is a 50 year old female who presents to clinic today for the following health issues accompanied by her :    Abdominal Pain   This is a recurrent problem. The current episode started more than 1 year ago. The problem occurs rarely. The problem has been gradually improving. The pain is located in the generalized abdominal region, periumbilical region and left flank. The quality of the pain is aching and dull. The pain is at a severity of 6/10. Pertinent negatives include anorexia, belching, diarrhea, constipation, dysuria and arthralgias. Nothing aggravates the symptoms. The symptoms are relieved by being still, passing flatus and recumbency.      Answers for HPI/ROS submitted by the patient on 4/5/2021   Abdominal pain  Onset quality: sudden  Episode duration: 5 days  Radiates to: LLQ, periumbilical region, suprapubic " "region, left flank        Review of Systems   Gastrointestinal: Positive for abdominal pain. Negative for anorexia, constipation and diarrhea.   Genitourinary: Negative for dysuria.   Musculoskeletal: Negative for arthralgias.      Constitutional, HEENT, cardiovascular, pulmonary, gi and gu systems are negative, except as otherwise noted.      Objective    /86   Pulse 91   Temp 99.1  F (37.3  C) (Temporal)   Resp 16   Ht 1.753 m (5' 9.02\")   Wt 87.1 kg (192 lb)   SpO2 97%   BMI 28.34 kg/m    Body mass index is 28.34 kg/m .  Physical Exam   GENERAL: healthy, alert and no distress  NECK: no adenopathy, no asymmetry, masses, or scars and thyroid normal to palpation  RESP: lungs clear to auscultation - no rales, rhonchi or wheezes  CV: regular rate and rhythm, normal S1 S2, no S3 or S4, no murmur, click or rub, no peripheral edema and peripheral pulses strong  ABDOMEN: soft, nontender, no hepatosplenomegaly, no masses and bowel sounds normal  MS: no gross musculoskeletal defects noted, no edema  NEURO: Normal strength and tone, mentation intact and speech normal  BACK: no CVA tenderness, no paralumbar tenderness  PSYCH: mentation appears normal, affect normal/bright          "

## 2021-04-06 ENCOUNTER — HOSPITAL ENCOUNTER (OUTPATIENT)
Facility: AMBULATORY SURGERY CENTER | Age: 51
End: 2021-04-06
Attending: INTERNAL MEDICINE
Payer: COMMERCIAL

## 2021-04-06 NOTE — RESULT ENCOUNTER NOTE
Viki,  Your recent studies showed a normal thyroid, we are still awaiting other labs.  Please let me know if you have any questions or concerns and follow up as discussed in clinic.    Sincerely.  Dr. ELYSIA Vega MD, FAAFP  Family Medicine Physician  [facility] [place]  [address] [place] [address]

## 2021-04-07 LAB
ALMOND IGE QN: <0.1 KU(A)/L
CASHEW NUT IGE QN: <0.1 KU(A)/L
CODFISH IGE QN: <0.1 KU(A)/L
COW MILK IGE QN: <0.1 KU(A)/L
EGG WHITE IGE QN: <0.1 KU(A)/L
HAZELNUT IGE QN: <0.1 KU(A)/L
IGE SERPL-ACNC: 53 KIU/L (ref 0–114)
PEANUT IGE QN: <0.1 KU(A)/L
SALMON IGE QN: <0.1 KU(A)/L
SCALLOP IGE QN: <0.1 KU(A)/L
SESAME SEED IGE QN: <0.1 KU(A)/L
SHRIMP IGE QN: <0.1 KU(A)/L
SOYBEAN IGE QN: <0.1 KU(A)/L
TTG IGA SER-ACNC: 1 U/ML
TTG IGG SER-ACNC: 1 U/ML
TUNA IGE QN: <0.1 KU(A)/L
WALNUT IGE QN: <0.1 KU(A)/L
WHEAT IGE QN: <0.1 KU(A)/L

## 2021-04-08 NOTE — RESULT ENCOUNTER NOTE
Viki,  Your recent studies were normal.  Continue with previously discussed plan.  Please let me know if you have any questions or concerns and follow up as discussed in clinic.    Sincerely.  Dr. ELYSIA Vega MD, [facility]  Family Medicine Physician  [facility] [place]  [address] [place] [address]

## 2021-04-10 DIAGNOSIS — Z11.59 ENCOUNTER FOR SCREENING FOR OTHER VIRAL DISEASES: Primary | ICD-10-CM

## 2021-04-25 ENCOUNTER — HEALTH MAINTENANCE LETTER (OUTPATIENT)
Age: 51
End: 2021-04-25

## 2021-05-17 ENCOUNTER — OFFICE VISIT (OUTPATIENT)
Dept: FAMILY MEDICINE | Facility: CLINIC | Age: 51
End: 2021-05-17
Payer: COMMERCIAL

## 2021-05-17 VITALS
BODY MASS INDEX: 28.36 KG/M2 | SYSTOLIC BLOOD PRESSURE: 111 MMHG | WEIGHT: 191.5 LBS | TEMPERATURE: 98.3 F | OXYGEN SATURATION: 97 % | HEIGHT: 69 IN | DIASTOLIC BLOOD PRESSURE: 81 MMHG | RESPIRATION RATE: 16 BRPM | HEART RATE: 104 BPM

## 2021-05-17 DIAGNOSIS — Z13.1 SCREENING FOR DIABETES MELLITUS: ICD-10-CM

## 2021-05-17 DIAGNOSIS — N92.0 MENORRHAGIA WITH REGULAR CYCLE: ICD-10-CM

## 2021-05-17 DIAGNOSIS — R20.9 DISTURBANCE OF SKIN SENSATION: ICD-10-CM

## 2021-05-17 DIAGNOSIS — Z12.11 SCREEN FOR COLON CANCER: ICD-10-CM

## 2021-05-17 DIAGNOSIS — E55.9 VITAMIN D DEFICIENCY: ICD-10-CM

## 2021-05-17 DIAGNOSIS — R79.0 LOW FERRITIN: ICD-10-CM

## 2021-05-17 DIAGNOSIS — Z00.00 ROUTINE GENERAL MEDICAL EXAMINATION AT A HEALTH CARE FACILITY: Primary | ICD-10-CM

## 2021-05-17 DIAGNOSIS — R79.89 ABNORMAL THYROID STIMULATING HORMONE (TSH) LEVEL: ICD-10-CM

## 2021-05-17 DIAGNOSIS — Z13.220 LIPID SCREENING: ICD-10-CM

## 2021-05-17 PROCEDURE — 99396 PREV VISIT EST AGE 40-64: CPT | Performed by: FAMILY MEDICINE

## 2021-05-17 ASSESSMENT — MIFFLIN-ST. JEOR: SCORE: 1553.02

## 2021-05-17 NOTE — PROGRESS NOTES
"   SUBJECTIVE:   CC: Viki Woodard is an 51 year old woman who presents for preventive health visit.     Patient has been advised of split billing requirements and indicates understanding: Yes  Healthy Habits:    Do you get at least three servings of calcium containing foods daily (dairy, green leafy vegetables, etc.)? yes    Amount of exercise or daily activities, outside of work: 3-4 day(s) per week    Problems taking medications regularly No    Medication side effects: No    Have you had an eye exam in the past two years? no    Do you see a dentist twice per year? yes    Do you have sleep apnea, excessive snoring or daytime drowsiness?no      Was seen 4/5/21 for LLQ abd pain. Had had 2 x's prior in the last 5 years.  No clear etiology found feels \"digestive\". Also had rash that started about 5 days after.  Got red itchy bumps on her elbows, rash on back of neck and into hair.  Rash is largely resolved although thinks there may be still a few bumps on the back of her neck.  All GI sx's have resolved  bm's are generally more on the looser side. Notes always had \"sensitive stomach\" her whole life. Has to avoid artifiscial flavors/colors     Got J and J vaccine.   Need to reschedule her colonoscopy.     Menses regular, little shorter cycle now. 25 days.   Heavy for 2 days. Has to change super tampon every 1-2 hours on these days. No hot flashes. Sees ob-gyn.     Sees Gyn in November, Dr. Calabrese.   Mammogram- has done 2 and both times had biopsies after.  She is worried to do another mammogram    Tips of fingers and right foot still little numb but other numbness she had previously had has largelly resolved  Told if another event could be MS.     Anxiety has been well managed. Tend to be worryer.         Today's PHQ-2 Score:   PHQ-2 ( 1999 [employer]) 5/17/2021 4/5/2021   Q1: Little interest or pleasure in doing things 0 0   Q2: Feeling down, depressed or hopeless 0 0   PHQ-2 Score 0 0   Q1: Little interest or " "pleasure in doing things - Not at all   Q2: Feeling down, depressed or hopeless - Not at all   PHQ-2 Score - 0       Abuse: Current or Past(Physical, Sexual or Emotional)- No  Do you feel safe in your environment? Yes    Have you ever done Advance Care Planning? (For example, a Health Directive, POLST, or a discussion with a medical provider or your loved ones about your wishes): No, advance care planning information given to patient to review.  Advanced care planning was discussed at today's visit.    Social History     Tobacco Use     Smoking status: Never Smoker     Smokeless tobacco: Never Used   Substance Use Topics     Alcohol use: Yes     Comment: wine- 2-3 glasses every 2-4 weeks      If you drink alcohol do you typically have >3 drinks per day or >7 drinks per week? No                     Reviewed orders with patient.  Reviewed health maintenance and updated orders accordingly - Yes      Mammogram Screening: Recommended annual mammography  Pertinent mammograms are reviewed under the imaging tab.    Pertinent mammograms are reviewed under the imaging tab.  History of abnormal Pap smear: NO - age 30-65 PAP every 5 years with negative HPV co-testing recommended     Reviewed and updated as needed this visit by clinical staff  Tobacco  Allergies  Meds   Med Hx  Surg Hx  Fam Hx  Soc Hx        Reviewed and updated as needed this visit by Provider                    ROS:   ROS: 10 point ROS neg other than the symptoms noted above in the HPI.     OBJECTIVE:   /81   Pulse 104   Temp 98.3  F (36.8  C) (Oral)   Resp 16   Ht 1.753 m (5' 9\")   Wt 86.9 kg (191 lb 8 oz)   LMP 05/13/2021 (Exact Date)   SpO2 97%   BMI 28.28 kg/m    EXAM:  GENERAL: healthy, alert and no distress  EYES: Eyes grossly normal to inspection, PERRL and conjunctivae and sclerae normal  HENT: ear canals and TM's normal, nose and mouth without ulcers or lesions  NECK: no adenopathy, no asymmetry, masses, or scars and thyroid normal " to palpation  RESP: lungs clear to auscultation - no rales, rhonchi or wheezes  CV: regular rate and rhythm, normal S1 S2, no S3 or S4, no murmur, click or rub, no peripheral edema and peripheral pulses strong  ABDOMEN: soft, nontender, no hepatosplenomegaly, no masses and bowel sounds normal   (female): Deferred, completed with GYN  MS: no gross musculoskeletal defects noted, no edema  SKIN: no suspicious lesions or rashes  NEURO: Normal strength and tone, mentation intact and speech normal  PSYCH: mentation appears normal, affect normal/bright  Component      Latest Ref Rng & Units 4/5/2021   IGE      0 - 114 KIU/L 53   Allergen [place]      <0.10 KU(A)/L <0.10   Allergen Cashew      <0.10 KU(A)/L <0.10   Allergen Fish(Cod)      <0.10 KU(A)/L <0.10   Allergen Egg White      <0.10 KU(A)/L <0.10   Allergen, Hazelnut      <0.10 KU(A)/L <0.10   Allergen Milk      <0.10 KU(A)/L <0.10   Allergen Peanut      <0.10 KU(A)/L <0.10   Allergen, [place]      <0.10 KU(A)/L <0.10   Allergen Scallop      <0.10 KU(A)/L <0.10   Allergen, Sesame Seed      <0.10 KU(A)/L <0.10   Allergen Shrimp      <0.10 KU(A)/L <0.10   Allergen Soybean IgE      <0.10 KU(A)/L <0.10   Allergen Tuna      <0.10 KU(A)/L <0.10   Allergen, [place]      <0.10 KU(A)/L <0.10   Allergen Wheat      <0.10 KU(A)/L <0.10   Tissue Transglutaminase Antibody IgA      <7 U/mL 1   Tissue Transglutaminase Tammy IgG      <7 U/mL 1   TSH      0.40 - 4.00 mU/L 3.53       ASSESSMENT/PLAN:   1. Routine general medical examination at a health care facility    2. Low ferritin  History of.  Recheck levels today especially given her heavier cycles    3. Abnormal thyroid stimulating hormone (TSH) level  Recent TSH was within normal limits.  Continue to monitor    4. Vitamin D deficiency  Is supplementing  - Vitamin D Deficiency; Future    5. Disturbance of skin sensation  History of extensive neurologic work-up due to concern of possible MS.  No definitive diagnosis was made but she  "also deferred the spinal tap CSF evaluation.  Encouraged her to follow-up with neurology given her persistent numbness which she is hesitant to do given things have improved over time.  We also reviewed that if any exacerbation of symptoms she should definitely see neurology    6. Screen for colon cancer  She is planning a colonoscopy but just needs to reschedule.  Encouraged her to do this especially in light of her bowel irregularities    7. Lipid screening  - Lipid panel reflex to direct LDL Fasting; Future    8. Screening for diabetes mellitus  - GLUCOSE; Future    9. Menorrhagia with regular cycle  Following with OB/GYN.  - CBC with platelets differential; Future  - Ferritin; Future    COUNSELING:   Reviewed preventive health counseling, as reflected in patient instructions       Regular exercise       Healthy diet/nutrition       Vision screening       Colon cancer screening       Advance Care Planning    Estimated body mass index is 28.28 kg/m  as calculated from the following:    Height as of this encounter: 1.753 m (5' 9\").    Weight as of this encounter: 86.9 kg (191 lb 8 oz).    Weight management plan: Discussed healthy diet and exercise guidelines    She reports that she has never smoked. She has never used smokeless tobacco.      Counseling Resources:  ATP IV Guidelines  Pooled Cohorts Equation Calculator  Breast Cancer Risk Calculator  BRCA-Related Cancer Risk Assessment: FHS-7 Tool  FRAX Risk Assessment  ICSI Preventive Guidelines  Dietary Guidelines for Americans, 2010  USDA's MyPlate  ASA Prophylaxis  Lung CA Screening    Patient Instructions   Start powdered fiber such as Metamucil or Citrucel: start 1 tsp per day, and increase by 1 tsp per week to goal total dosing of 1-2 tablespoons per day. Drink plenty of water daily for fiber to be effective.     Reschedule colonoscopy.   Consider a visit to gastroenterology if ongoing abdominal pain/sensitive stomach    Schedule mammogram. Please have them " send us a copy of your results.     I encourage you to consider the vaccine to prevent shingles.  (SHINGRIX)  This is two vaccines 2-6 months apart.   Check with your insurance company regarding coverage.  If you are on Medicare you should get this vaccine at the pharmacy  Your tetanus vaccine is due by 2/2022.               Preventive Health Recommendations  Female Ages 50 - 64    Yearly exam: See your health care provider every year in order to  o Review health changes.   o Discuss preventive care.    o Review your medicines if your doctor has prescribed any.      Get a Pap test every three years (unless you have an abnormal result and your provider advises testing more often).    If you get Pap tests with HPV test, you only need to test every 5 years, unless you have an abnormal result.     You do not need a Pap test if your uterus was removed (hysterectomy) and you have not had cancer.    You should be tested each year for STDs (sexually transmitted diseases) if you're at risk.     Have a mammogram every 1 to 2 years.    Have a colonoscopy at age 50, or have a yearly FIT test (stool test). These exams screen for colon cancer.      Have a cholesterol test every 5 years, or more often if advised.    Have a diabetes test (fasting glucose) every three years. If you are at risk for diabetes, you should have this test more often.     If you are at risk for osteoporosis (brittle bone disease), think about having a bone density scan (DEXA).    Shots: Get a flu shot each year. Get a tetanus shot every 10 years.    Nutrition:     Eat at least 5 servings of fruits and vegetables each day.    Eat whole-grain bread, whole-wheat pasta and brown rice instead of white grains and rice.    Get adequate Calcium and Vitamin D.     Lifestyle    Exercise at least 150 minutes a week (30 minutes a day, 5 days a week). This will help you control your weight and prevent disease.    Limit alcohol to one drink per day.    No smoking.      Wear sunscreen to prevent skin cancer.     See your dentist every six months for an exam and cleaning.    See your eye doctor every 1 to 2 years.          Elise Shannon MD  [facility]

## 2021-05-17 NOTE — PATIENT INSTRUCTIONS
Start powdered fiber such as Metamucil or Citrucel: start 1 tsp per day, and increase by 1 tsp per week to goal total dosing of 1-2 tablespoons per day. Drink plenty of water daily for fiber to be effective.     Reschedule colonoscopy.   Consider a visit to gastroenterology if ongoing abdominal pain/sensitive stomach    Schedule mammogram. Please have them send us a copy of your results.     I encourage you to consider the vaccine to prevent shingles.  (SHINGRIX)  This is two vaccines 2-6 months apart.   Check with your insurance company regarding coverage.  If you are on Medicare you should get this vaccine at the pharmacy  Your tetanus vaccine is due by 2/2022.               Preventive Health Recommendations  Female Ages 50 - 64    Yearly exam: See your health care provider every year in order to  o Review health changes.   o Discuss preventive care.    o Review your medicines if your doctor has prescribed any.      Get a Pap test every three years (unless you have an abnormal result and your provider advises testing more often).    If you get Pap tests with HPV test, you only need to test every 5 years, unless you have an abnormal result.     You do not need a Pap test if your uterus was removed (hysterectomy) and you have not had cancer.    You should be tested each year for STDs (sexually transmitted diseases) if you're at risk.     Have a mammogram every 1 to 2 years.    Have a colonoscopy at age 50, or have a yearly FIT test (stool test). These exams screen for colon cancer.      Have a cholesterol test every 5 years, or more often if advised.    Have a diabetes test (fasting glucose) every three years. If you are at risk for diabetes, you should have this test more often.     If you are at risk for osteoporosis (brittle bone disease), think about having a bone density scan (DEXA).    Shots: Get a flu shot each year. Get a tetanus shot every 10 years.    Nutrition:     Eat at least 5 servings of fruits and  vegetables each day.    Eat whole-grain bread, whole-wheat pasta and brown rice instead of white grains and rice.    Get adequate Calcium and Vitamin D.     Lifestyle    Exercise at least 150 minutes a week (30 minutes a day, 5 days a week). This will help you control your weight and prevent disease.    Limit alcohol to one drink per day.    No smoking.     Wear sunscreen to prevent skin cancer.     See your dentist every six months for an exam and cleaning.    See your eye doctor every 1 to 2 years.

## 2021-05-21 DIAGNOSIS — N92.0 MENORRHAGIA WITH REGULAR CYCLE: ICD-10-CM

## 2021-05-21 DIAGNOSIS — Z13.1 SCREENING FOR DIABETES MELLITUS: ICD-10-CM

## 2021-05-21 DIAGNOSIS — E55.9 VITAMIN D DEFICIENCY: ICD-10-CM

## 2021-05-21 DIAGNOSIS — Z13.220 LIPID SCREENING: ICD-10-CM

## 2021-05-21 LAB
BASOPHILS # BLD AUTO: 0 10E9/L (ref 0–0.2)
BASOPHILS NFR BLD AUTO: 0.3 %
CHOLEST SERPL-MCNC: 249 MG/DL
DEPRECATED CALCIDIOL+CALCIFEROL SERPL-MC: 16 UG/L (ref 20–75)
DIFFERENTIAL METHOD BLD: NORMAL
EOSINOPHIL # BLD AUTO: 0.2 10E9/L (ref 0–0.7)
EOSINOPHIL NFR BLD AUTO: 2.5 %
ERYTHROCYTE [DISTWIDTH] IN BLOOD BY AUTOMATED COUNT: 14.2 % (ref 10–15)
FERRITIN SERPL-MCNC: 8 NG/ML (ref 8–252)
GLUCOSE SERPL-MCNC: 87 MG/DL (ref 70–99)
HCT VFR BLD AUTO: 37 % (ref 35–47)
HDLC SERPL-MCNC: 48 MG/DL
HGB BLD-MCNC: 12 G/DL (ref 11.7–15.7)
LDLC SERPL CALC-MCNC: 148 MG/DL
LYMPHOCYTES # BLD AUTO: 2.4 10E9/L (ref 0.8–5.3)
LYMPHOCYTES NFR BLD AUTO: 30.5 %
MCH RBC QN AUTO: 27.8 PG (ref 26.5–33)
MCHC RBC AUTO-ENTMCNC: 32.4 G/DL (ref 31.5–36.5)
MCV RBC AUTO: 86 FL (ref 78–100)
MONOCYTES # BLD AUTO: 0.4 10E9/L (ref 0–1.3)
MONOCYTES NFR BLD AUTO: 4.8 %
NEUTROPHILS # BLD AUTO: 4.9 10E9/L (ref 1.6–8.3)
NEUTROPHILS NFR BLD AUTO: 61.9 %
NONHDLC SERPL-MCNC: 201 MG/DL
PLATELET # BLD AUTO: 315 10E9/L (ref 150–450)
RBC # BLD AUTO: 4.32 10E12/L (ref 3.8–5.2)
TRIGL SERPL-MCNC: 265 MG/DL
WBC # BLD AUTO: 8 10E9/L (ref 4–11)

## 2021-05-21 PROCEDURE — 80061 LIPID PANEL: CPT | Performed by: FAMILY MEDICINE

## 2021-05-21 PROCEDURE — 82728 ASSAY OF FERRITIN: CPT | Performed by: FAMILY MEDICINE

## 2021-05-21 PROCEDURE — 82947 ASSAY GLUCOSE BLOOD QUANT: CPT | Performed by: FAMILY MEDICINE

## 2021-05-21 PROCEDURE — 36415 COLL VENOUS BLD VENIPUNCTURE: CPT | Performed by: FAMILY MEDICINE

## 2021-05-21 PROCEDURE — 85025 COMPLETE CBC W/AUTO DIFF WBC: CPT | Performed by: FAMILY MEDICINE

## 2021-05-21 PROCEDURE — 82306 VITAMIN D 25 HYDROXY: CPT | Performed by: FAMILY MEDICINE

## 2021-05-24 DIAGNOSIS — E78.2 MIXED HYPERLIPIDEMIA: ICD-10-CM

## 2021-05-24 DIAGNOSIS — E55.9 VITAMIN D DEFICIENCY: ICD-10-CM

## 2021-05-24 DIAGNOSIS — R79.0 LOW FERRITIN: Primary | ICD-10-CM

## 2021-06-07 ENCOUNTER — TELEPHONE (OUTPATIENT)
Dept: FAMILY MEDICINE | Facility: CLINIC | Age: 51
End: 2021-06-07

## 2021-06-07 NOTE — TELEPHONE ENCOUNTER
Patient Quality Outreach Summary      Summary:    Patient is due/failing the following:   Breast Cancer Screening - Mammogram    Type of outreach:    Sent [employer] message.    Questions for provider review:    None                                                                                                                    Annie Perdomo

## 2021-09-16 ENCOUNTER — MYC MEDICAL ADVICE (OUTPATIENT)
Dept: FAMILY MEDICINE | Facility: CLINIC | Age: 51
End: 2021-09-16

## 2021-09-16 DIAGNOSIS — K57.90 DIVERTICULAR DISEASE: Primary | ICD-10-CM

## 2021-09-22 ENCOUNTER — TELEPHONE (OUTPATIENT)
Dept: GASTROENTEROLOGY | Facility: CLINIC | Age: 51
End: 2021-09-22

## 2021-09-22 DIAGNOSIS — Z11.59 ENCOUNTER FOR SCREENING FOR OTHER VIRAL DISEASES: ICD-10-CM

## 2021-09-22 NOTE — TELEPHONE ENCOUNTER
Screening Questions  1. Are you active on mychart? YES    2. What insurance is in the chart? [employer]    2.  Ordering/Referring Provider: Johnathan Vega MD    3. BMI 27.3    4. Are you on daily home oxygen? N    5. Do you have a history of difficult airway? N    6. Have you had a heart, lung, or liver transplant? N    7. Are you currently on dialysis or have chronic kidney disease? N    8. Have you had a stroke or Transient ischemic atttack (TIA) within 6 months? N    9. In the past 6 months, have you had any heart related issues including cardiomyopathy or heart attack?         If yes, did it require cardiac stenting or other implantable device?N    10. Do you have any implantable devices in your body (pacemaker, defib, LVAD)? N    11. Do you take nitroglycerin? If yes, how often? N    12. Are you currently taking any blood thinners?N    13. Are you a diabetic? N    14. (Females) Are you currently pregnant? N  If yes, how many weeks?    15. Have you had a procedure in the past that was difficult to tolerate with conscious sedation? Any allergies to Fentanyl or Versed N    16. Are you taking any scheduled prescription narcotics more than once daily? N    17. Do you have any chemical dependencies such as alcohol, street drugs, or methadone? N    18. Do you have any history of post-traumatic stress syndrome or mental health issues? N    19. Do you transfer independently? Y    20.  Do you have any issues with constipation? N    21. Preferred Pharmacy for Pre Prescription [facility]    Scheduling Details    Which Colonoscopy Prep was Sent?: mprep  Procedure Scheduled: COLON  Provider/Surgeon: FAVIAN  Date of Procedure: 10/07  Location:   Caller (Please ask for phone number if not scheduled by patient): CLAUDINE      Sedation Type: CS  Conscious Sedation- Needs  for 6 hours after the procedure  MAC/General-Needs  for 24 hours after procedure    Pre-op Required at [facility] [place], [place] and  OR for MAC sedation:   (if yes advise patient they will need a pre-op prior to procedure)      Is patient on blood thinners? -N (If yes- inform patient to follow up with PCP or provider for follow up instructions)     Informed patient they will need an adult  Y  Cannot take any type of public or medical transportation alone    Pre-Procedure Covid test to be completed at [facility] or Externally: ANNA 10/05    Confirmed Nurse will call to complete assessment Y    Additional comments:

## 2021-09-23 ENCOUNTER — TELEPHONE (OUTPATIENT)
Dept: GASTROENTEROLOGY | Facility: CLINIC | Age: 51
End: 2021-09-23

## 2021-09-23 NOTE — TELEPHONE ENCOUNTER
Caller: Viki called to reschedule    Procedure: Colonoscopy     Date of Procedure Cancelled: 10/07/2021 with [facility]     Ordering Provider:Elise Shannon MD    Reason for cancel: Wanted to schedule with Dr. Gray     Any Staff messages sent regarding case?: no                   Recipient and Sender:                    Message Details:       Rescheduled: YES, SAME DAY with Dr. Gray      If rescheduled:    Date: 10/07/21    Location: [facility] [place]    Note any change or update to original order/sedation:

## 2021-09-27 ENCOUNTER — TELEPHONE (OUTPATIENT)
Dept: GASTROENTEROLOGY | Facility: CLINIC | Age: 51
End: 2021-09-27

## 2021-09-27 NOTE — TELEPHONE ENCOUNTER
9/23/2021 1112 VM from patient, states she had a CT scan in August and she would like to be sure that the provider that will be performing her colonoscopy has access to it.      Patient contacted, advised that CT scan report is available in CareEverywhere, will request Dr. Gray to view the report prior to the procedure.  If actual images are needed, patient gives verbal consent to request that the images be pushed to [place] for review.    Brigida Amanda RN

## 2021-10-05 ENCOUNTER — LAB (OUTPATIENT)
Dept: URGENT CARE | Facility: URGENT CARE | Age: 51
End: 2021-10-05
Payer: COMMERCIAL

## 2021-10-05 DIAGNOSIS — Z11.59 ENCOUNTER FOR SCREENING FOR OTHER VIRAL DISEASES: ICD-10-CM

## 2021-10-05 PROCEDURE — U0003 INFECTIOUS AGENT DETECTION BY NUCLEIC ACID (DNA OR RNA); SEVERE ACUTE RESPIRATORY SYNDROME CORONAVIRUS 2 (SARS-COV-2) (CORONAVIRUS DISEASE [COVID-19]), AMPLIFIED PROBE TECHNIQUE, MAKING USE OF HIGH THROUGHPUT TECHNOLOGIES AS DESCRIBED BY CMS-2020-01-R: HCPCS

## 2021-10-05 PROCEDURE — U0005 INFEC AGEN DETEC AMPLI PROBE: HCPCS

## 2021-10-06 LAB — SARS-COV-2 RNA RESP QL NAA+PROBE: NEGATIVE

## 2021-10-07 ENCOUNTER — HOSPITAL ENCOUNTER (OUTPATIENT)
Facility: AMBULATORY SURGERY CENTER | Age: 51
Discharge: HOME | End: 2021-10-07
Attending: INTERNAL MEDICINE | Admitting: INTERNAL MEDICINE
Payer: COMMERCIAL

## 2021-10-07 VITALS
HEART RATE: 72 BPM | SYSTOLIC BLOOD PRESSURE: 111 MMHG | OXYGEN SATURATION: 98 % | TEMPERATURE: 97.2 F | DIASTOLIC BLOOD PRESSURE: 77 MMHG | RESPIRATION RATE: 16 BRPM

## 2021-10-07 LAB — COLONOSCOPY: NORMAL

## 2021-10-07 PROCEDURE — 45378 DIAGNOSTIC COLONOSCOPY: CPT

## 2021-10-07 PROCEDURE — G8918 PT W/O PREOP ORDER IV AB PRO: HCPCS

## 2021-10-07 PROCEDURE — G8907 PT DOC NO EVENTS ON DISCHARG: HCPCS

## 2021-10-07 RX ORDER — ONDANSETRON 2 MG/ML
4 INJECTION INTRAMUSCULAR; INTRAVENOUS
Status: COMPLETED | OUTPATIENT
Start: 2021-10-07 | End: 2021-10-07

## 2021-10-07 RX ORDER — NALOXONE HYDROCHLORIDE 0.4 MG/ML
0.4 INJECTION, SOLUTION INTRAMUSCULAR; INTRAVENOUS; SUBCUTANEOUS
Status: DISCONTINUED | OUTPATIENT
Start: 2021-10-07 | End: 2021-10-08 | Stop reason: HOSPADM

## 2021-10-07 RX ORDER — NALOXONE HYDROCHLORIDE 0.4 MG/ML
0.2 INJECTION, SOLUTION INTRAMUSCULAR; INTRAVENOUS; SUBCUTANEOUS
Status: DISCONTINUED | OUTPATIENT
Start: 2021-10-07 | End: 2021-10-08 | Stop reason: HOSPADM

## 2021-10-07 RX ORDER — FLUMAZENIL 0.1 MG/ML
0.2 INJECTION, SOLUTION INTRAVENOUS
Status: DISCONTINUED | OUTPATIENT
Start: 2021-10-07 | End: 2021-10-08 | Stop reason: HOSPADM

## 2021-10-07 RX ORDER — FENTANYL CITRATE 50 UG/ML
INJECTION, SOLUTION INTRAMUSCULAR; INTRAVENOUS PRN
Status: DISCONTINUED | OUTPATIENT
Start: 2021-10-07 | End: 2021-10-07 | Stop reason: HOSPADM

## 2021-10-07 RX ORDER — ONDANSETRON 2 MG/ML
4 INJECTION INTRAMUSCULAR; INTRAVENOUS EVERY 6 HOURS PRN
Status: DISCONTINUED | OUTPATIENT
Start: 2021-10-07 | End: 2021-10-08 | Stop reason: HOSPADM

## 2021-10-07 RX ORDER — PROCHLORPERAZINE MALEATE 10 MG
10 TABLET ORAL EVERY 6 HOURS PRN
Status: DISCONTINUED | OUTPATIENT
Start: 2021-10-07 | End: 2021-10-08 | Stop reason: HOSPADM

## 2021-10-07 RX ORDER — LIDOCAINE 40 MG/G
CREAM TOPICAL
Status: DISCONTINUED | OUTPATIENT
Start: 2021-10-07 | End: 2021-10-08 | Stop reason: HOSPADM

## 2021-10-07 RX ORDER — ONDANSETRON 4 MG/1
4 TABLET, ORALLY DISINTEGRATING ORAL EVERY 6 HOURS PRN
Status: DISCONTINUED | OUTPATIENT
Start: 2021-10-07 | End: 2021-10-08 | Stop reason: HOSPADM

## 2021-10-07 RX ADMIN — ONDANSETRON 4 MG: 2 INJECTION INTRAMUSCULAR; INTRAVENOUS at 10:54

## 2021-10-09 ENCOUNTER — HEALTH MAINTENANCE LETTER (OUTPATIENT)
Age: 51
End: 2021-10-09

## 2022-03-26 ENCOUNTER — HEALTH MAINTENANCE LETTER (OUTPATIENT)
Age: 52
End: 2022-03-26

## 2022-07-16 ENCOUNTER — HEALTH MAINTENANCE LETTER (OUTPATIENT)
Age: 52
End: 2022-07-16

## 2022-09-17 ENCOUNTER — HEALTH MAINTENANCE LETTER (OUTPATIENT)
Age: 52
End: 2022-09-17

## 2023-03-13 ENCOUNTER — OFFICE VISIT (OUTPATIENT)
Dept: FAMILY MEDICINE | Facility: CLINIC | Age: 53
End: 2023-03-13
Payer: COMMERCIAL

## 2023-03-13 ENCOUNTER — ANCILLARY PROCEDURE (OUTPATIENT)
Dept: GENERAL RADIOLOGY | Facility: CLINIC | Age: 53
End: 2023-03-13
Attending: FAMILY MEDICINE
Payer: COMMERCIAL

## 2023-03-13 VITALS
DIASTOLIC BLOOD PRESSURE: 87 MMHG | HEIGHT: 69 IN | HEART RATE: 83 BPM | OXYGEN SATURATION: 99 % | TEMPERATURE: 99.4 F | BODY MASS INDEX: 28.97 KG/M2 | SYSTOLIC BLOOD PRESSURE: 128 MMHG | WEIGHT: 195.6 LBS

## 2023-03-13 DIAGNOSIS — R07.81 RIB PAIN ON RIGHT SIDE: Primary | ICD-10-CM

## 2023-03-13 DIAGNOSIS — R07.81 RIB PAIN ON RIGHT SIDE: ICD-10-CM

## 2023-03-13 PROCEDURE — 71101 X-RAY EXAM UNILAT RIBS/CHEST: CPT | Mod: RT | Performed by: RADIOLOGY

## 2023-03-13 PROCEDURE — 99214 OFFICE O/P EST MOD 30 MIN: CPT | Performed by: INTERNAL MEDICINE

## 2023-03-13 RX ORDER — GABAPENTIN 300 MG/1
300 CAPSULE ORAL AT BEDTIME
Qty: 30 CAPSULE | Refills: 0 | Status: SHIPPED | OUTPATIENT
Start: 2023-03-13 | End: 2023-04-10

## 2023-03-13 RX ORDER — NAPROXEN 500 MG/1
500 TABLET ORAL 2 TIMES DAILY WITH MEALS
Qty: 60 TABLET | Refills: 0 | Status: SHIPPED | OUTPATIENT
Start: 2023-03-13 | End: 2023-04-10

## 2023-03-13 ASSESSMENT — PAIN SCALES - GENERAL: PAINLEVEL: SEVERE PAIN (7)

## 2023-03-13 NOTE — PROGRESS NOTES
"  Assessment & Plan     Rib pain on right side  Complaining of pain on the right side of her rib cage  Pain is following the path of the rib  It is mainly in the 10th and 11th intercostal areas  No history of trauma  It is a burning pain  Symptoms started a week ago  She has not seen any rash to rule out  It is difficult to say what this pain could be but if it is diagnosis include  a bruised rib versus formes fruste shingles  She did have shingles in the past  However there is no rash and at this point I am reluctant to start any antiviral since I do not have enough evidence for shingles  If the pain gets worse or if the rash develops we can revisit the diagnosis  If the pain is not getting better in the next 3 to 4 days I would not hesitate to get a CT  Pain is not pleuritic in nature so I do not have any reason to suspect thromboembolic disorder  - naproxen (NAPROSYN) 500 MG tablet; Take 1 tablet (500 mg) by mouth 2 times daily (with meals)  - gabapentin (NEURONTIN) 300 MG capsule; Take 1 capsule (300 mg) by mouth At Bedtime  - XR Ribs & Chest Right G/E 3 Views; Future      30 minutes spent on the date of the encounter doing chart review, history and exam, documentation and further activities per the note       BMI:   Estimated body mass index is 28.89 kg/m  as calculated from the following:    Height as of this encounter: 1.753 m (5' 9\").    Weight as of this encounter: 88.7 kg (195 lb 9.6 oz).           Return in about 4 weeks (around 4/10/2023).    Daljit Manley MD  [facility]    Oneyda Drew is a 52 year old, presenting for the following health issues:  Chest Pain (Right rib pain x 1 wk)      History of Present Illness       Reason for visit:  Rib pain  Symptom onset:  3-7 days ago    She eats 4 or more servings of fruits and vegetables daily.She consumes 0 sweetened beverage(s) daily.She exercises with enough effort to increase her heart rate 30 to 60 minutes per day.  She " "exercises with enough effort to increase her heart rate 3 or less days per week.   She is taking medications regularly.             Review of Systems   Constitutional, HEENT, cardiovascular, pulmonary, gi and gu systems are negative, except as otherwise noted.      Objective    /87 (BP Location: Right arm, Patient Position: Sitting, Cuff Size: Adult Regular)   Pulse 83   Temp 99.4  F (37.4  C) (Tympanic)   Ht 1.753 m (5' 9\")   Wt 88.7 kg (195 lb 9.6 oz)   LMP 03/01/2023 (Exact Date)   SpO2 99%   BMI 28.89 kg/m    Body mass index is 28.89 kg/m .  Physical Exam   GENERAL: healthy, alert and no distress  EYES: Eyes grossly normal to inspection, PERRL and conjunctivae and sclerae normal  RESP: lungs clear to auscultation - no rales, rhonchi or wheezes  CV: regular rate and rhythm, normal S1 S2, no S3 or S4, no murmur, click or rub, no peripheral edema and peripheral pulses strong  ABDOMEN: soft, nontender, no hepatosplenomegaly, no masses and bowel sounds normal  MS: no gross musculoskeletal defects noted, no edema  SKIN: She has some tenderness on palpation of the 10th and 11th intercostal areas in the mid axillary line                    "

## 2023-05-06 ENCOUNTER — HEALTH MAINTENANCE LETTER (OUTPATIENT)
Age: 53
End: 2023-05-06

## 2023-06-30 ENCOUNTER — TRANSCRIBE ORDERS (OUTPATIENT)
Dept: OTHER | Age: 53
End: 2023-06-30

## 2023-06-30 DIAGNOSIS — H52.4 PRESBYOPIA: ICD-10-CM

## 2023-06-30 DIAGNOSIS — H52.00: Primary | ICD-10-CM

## 2023-06-30 DIAGNOSIS — H51.8 DISSOCIATED VERTICAL DEVIATION: ICD-10-CM

## 2023-07-11 ENCOUNTER — TELEPHONE (OUTPATIENT)
Dept: OPHTHALMOLOGY | Facility: CLINIC | Age: 53
End: 2023-07-11
Payer: COMMERCIAL

## 2023-07-11 NOTE — TELEPHONE ENCOUNTER
can you please send a new pt packet     Called and scheduled [facility] for Sept with Dr. Villafana     Discussed wait time     Location of clinic     Parking     Pt packet - request     Елена Elizondo Communication Facilitator on 7/11/2023 at 11:27 AM

## 2023-08-17 ENCOUNTER — OFFICE VISIT (OUTPATIENT)
Dept: FAMILY MEDICINE | Facility: CLINIC | Age: 53
End: 2023-08-17
Payer: COMMERCIAL

## 2023-08-17 VITALS
TEMPERATURE: 98.2 F | HEART RATE: 89 BPM | DIASTOLIC BLOOD PRESSURE: 79 MMHG | HEIGHT: 69 IN | OXYGEN SATURATION: 98 % | BODY MASS INDEX: 28.68 KG/M2 | SYSTOLIC BLOOD PRESSURE: 109 MMHG | WEIGHT: 193.6 LBS | RESPIRATION RATE: 13 BRPM

## 2023-08-17 DIAGNOSIS — Z83.49 FAMILY HISTORY OF THYROID DISEASE: ICD-10-CM

## 2023-08-17 DIAGNOSIS — R53.83 OTHER FATIGUE: ICD-10-CM

## 2023-08-17 DIAGNOSIS — Z12.4 CERVICAL CANCER SCREENING: ICD-10-CM

## 2023-08-17 DIAGNOSIS — Z13.1 SCREENING FOR DIABETES MELLITUS: ICD-10-CM

## 2023-08-17 DIAGNOSIS — N92.4 EXCESSIVE BLEEDING IN PREMENOPAUSAL PERIOD: ICD-10-CM

## 2023-08-17 DIAGNOSIS — Z13.220 SCREENING CHOLESTEROL LEVEL: ICD-10-CM

## 2023-08-17 DIAGNOSIS — Z12.31 VISIT FOR SCREENING MAMMOGRAM: ICD-10-CM

## 2023-08-17 DIAGNOSIS — Z00.00 ROUTINE GENERAL MEDICAL EXAMINATION AT A HEALTH CARE FACILITY: Primary | ICD-10-CM

## 2023-08-17 PROCEDURE — 90471 IMMUNIZATION ADMIN: CPT | Performed by: FAMILY MEDICINE

## 2023-08-17 PROCEDURE — 90750 HZV VACC RECOMBINANT IM: CPT | Performed by: FAMILY MEDICINE

## 2023-08-17 PROCEDURE — 90472 IMMUNIZATION ADMIN EACH ADD: CPT | Performed by: FAMILY MEDICINE

## 2023-08-17 PROCEDURE — 99396 PREV VISIT EST AGE 40-64: CPT | Mod: 25 | Performed by: FAMILY MEDICINE

## 2023-08-17 PROCEDURE — 90715 TDAP VACCINE 7 YRS/> IM: CPT | Performed by: FAMILY MEDICINE

## 2023-08-17 PROCEDURE — 99213 OFFICE O/P EST LOW 20 MIN: CPT | Mod: 25 | Performed by: FAMILY MEDICINE

## 2023-08-17 ASSESSMENT — ENCOUNTER SYMPTOMS
JOINT SWELLING: 0
SHORTNESS OF BREATH: 0
FREQUENCY: 0
DYSURIA: 0
HEMATOCHEZIA: 0
WEAKNESS: 0
FEVER: 0
NERVOUS/ANXIOUS: 0
DIARRHEA: 0
HEARTBURN: 0
HEADACHES: 0
PARESTHESIAS: 0
PALPITATIONS: 0
ARTHRALGIAS: 0
CHILLS: 0
CONSTIPATION: 0
SORE THROAT: 0
DIZZINESS: 0
MYALGIAS: 0
COUGH: 0
BREAST MASS: 0
NAUSEA: 0
ABDOMINAL PAIN: 0
EYE PAIN: 0
HEMATURIA: 0

## 2023-08-17 ASSESSMENT — PAIN SCALES - GENERAL: PAINLEVEL: NO PAIN (0)

## 2023-08-17 NOTE — NURSING NOTE
Prior to immunization administration, verified patients identity using patient s name and date of birth. Please see Immunization Activity for additional information.     Screening Questionnaire for Adult Immunization    Are you sick today?   No   Do you have allergies to medications, food, a vaccine component or latex?   No   Have you ever had a serious reaction after receiving a vaccination?   No   Do you have a long-term health problem with heart, lung, kidney, or metabolic disease (e.g., diabetes), asthma, a blood disorder, no spleen, complement component deficiency, a cochlear implant, or a spinal fluid leak?  Are you on long-term aspirin therapy?   No   Do you have cancer, leukemia, HIV/AIDS, or any other immune system problem?   No   Do you have a parent, brother, or sister with an immune system problem?   No   In the past 3 months, have you taken medications that affect  your immune system, such as prednisone, other steroids, or anticancer drugs; drugs for the treatment of rheumatoid arthritis, Crohn s disease, or psoriasis; or have you had radiation treatments?   No   Have you had a seizure, or a brain or other nervous system problem?   No   During the past year, have you received a transfusion of blood or blood    products, or been given immune (gamma) globulin or antiviral drug?   No   For women: Are you pregnant or is there a chance you could become       pregnant during the next month?   No   Have you received any vaccinations in the past 4 weeks?   No     Immunization questionnaire answers were all negative.      Patient instructed to remain in clinic for 15 minutes afterwards, and to report any adverse reactions.     Screening performed by Maria Isabel Mueller MA on 8/17/2023 at 1:30 PM.

## 2023-08-17 NOTE — PROGRESS NOTES
SUBJECTIVE:   CC: Viki is an 53 year old who presents for preventive health visit.       8/17/2023    12:34 PM   Additional Questions   Roomed by Елена LYNN   Accompanied by Self         8/17/2023    12:34 PM   Patient Reported Additional Medications   Patient reports taking the following new medications None       Healthy Habits:     Getting at least 3 servings of Calcium per day:  Yes    Bi-annual eye exam:  Yes    Dental care twice a year:  Yes    Sleep apnea or symptoms of sleep apnea:  None    Diet:  Regular (no restrictions)    Frequency of exercise:  4-5 days/week    Duration of exercise:  30-45 minutes    Taking medications regularly:  Not Applicable    Medication side effects:  Not applicable    Additional concerns today:  No    Mammo in Sept.     Tried to take iron after labs 2021. Unable to tolerate due to constipation. Also tried with psyllium    Menses ongoing and continue to be heavy for 2 days each month. Following with gyn    Poor energy, present for many years  Walk for exercise    Mvi daily but not other supplements    Believes hep B vaccine is utd.         Social History     Tobacco Use    Smoking status: Never     Passive exposure: Never    Smokeless tobacco: Never   Substance Use Topics    Alcohol use: Yes     Comment: wine- 2-3 glasses every 2-4 weeks              8/17/2023    12:29 PM   Alcohol Use   Prescreen: >3 drinks/day or >7 drinks/week? No     Reviewed orders with patient.  Reviewed health maintenance and updated orders accordingly - Yes      Breast Cancer Screening:    FHS-7:       8/17/2023    12:30 PM   Breast CA Risk Assessment (FHS-7)   Did any of your first-degree relatives have breast or ovarian cancer? Yes   Did any of your relatives have bilateral breast cancer? Unknown   Did any man in your family have breast cancer? No   Did any woman in your family have breast and ovarian cancer? Yes   Did any woman in your family have breast cancer before age 50 y? No   Do you have 2  "or more relatives with breast and/or ovarian cancer? No   Do you have 2 or more relatives with breast and/or bowel cancer? Unknown       Mammogram Screening: Recommended annual mammography  Pertinent mammograms are reviewed under the imaging tab.    History of abnormal Pap smear: NO - age 30-65 PAP every 5 years with negative HPV co-testing recommended     Reviewed and updated as needed this visit by clinical staff   Tobacco  Allergies  Meds              Reviewed and updated as needed this visit by Provider                     Review of Systems   Constitutional:  Negative for chills and fever.   HENT:  Negative for congestion, ear pain, hearing loss and sore throat.    Eyes:  Negative for pain and visual disturbance.   Respiratory:  Negative for cough and shortness of breath.    Cardiovascular:  Negative for chest pain, palpitations and peripheral edema.   Gastrointestinal:  Negative for abdominal pain, constipation, diarrhea, heartburn, hematochezia and nausea.   Breasts:  Negative for tenderness, breast mass and discharge.   Genitourinary:  Negative for dysuria, frequency, genital sores, hematuria, pelvic pain, urgency, vaginal bleeding and vaginal discharge.   Musculoskeletal:  Negative for arthralgias, joint swelling and myalgias.   Skin:  Negative for rash.   Neurological:  Negative for dizziness, weakness, headaches and paresthesias.   Psychiatric/Behavioral:  Negative for mood changes. The patient is not nervous/anxious.         OBJECTIVE:   /79 (BP Location: Right arm, Patient Position: Sitting, Cuff Size: Adult Regular)   Pulse 89   Temp 98.2  F (36.8  C) (Oral)   Resp 13   Ht 1.753 m (5' 9\")   Wt 87.8 kg (193 lb 9.6 oz)   LMP 08/17/2023 (Exact Date)   SpO2 98%   BMI 28.59 kg/m    Physical Exam  GENERAL: healthy, alert and no distress  EYES: Eyes with mild disconjugate gaze, PERRL and conjunctivae and sclerae normal  HENT: ear canals and TM's normal, nose and mouth without ulcers or " lesions  NECK: no adenopathy, no asymmetry, masses, or scars and thyroid normal to palpation  RESP: lungs clear to auscultation - no rales, rhonchi or wheezes  CV: regular rate and rhythm, normal S1 S2, no S3 or S4, no murmur, click or rub, no peripheral edema and peripheral pulses strong  ABDOMEN: soft, nontender, no hepatosplenomegaly, no masses and bowel sounds normal  MS: no gross musculoskeletal defects noted, no edema  SKIN: no suspicious lesions or rashes  NEURO: Normal strength and tone, mentation intact and speech normal  PSYCH: mentation appears normal, affect normal/bright    Diagnostic Test Results:  Labs reviewed in Epic    ASSESSMENT/PLAN:   (Z00.00) Routine general medical examination at a health care facility  (primary encounter diagnosis)    (N92.4) Excessive bleeding in premenopausal period  Comment: We briefly reviewed options for managing bleeding.  She will follow-up with her gynecologist for review and management  Plan: CBC with platelets and differential, Ferritin        We will rule out anemia given the heavy cycles    (Z83.49) Family history of thyroid disease  Comment:   Plan: TSH with free T4 reflex            (R53.83) Other fatigue  Comment: Long-term fatigue, most of her life.  She does have some light snoring and we did discuss the possibility of underlying sleep apnea  Plan: CBC with platelets and differential, Ferritin,         Vitamin D Deficiency, TSH with free T4 reflex        She is not really interested in further work-up at this point with sleep evaluation but would consider    (Z12.31) Visit for screening mammogram  Plan: Encouraged annual mammogram    (Z12.4) Cervical cancer screening  Comment:   Plan: Completed with GYN    (Z13.220) Screening cholesterol level  Comment:   Plan: Lipid panel reflex to direct LDL Fasting            (Z13.1) Screening for diabetes mellitus  Comment:   Plan: Glucose                  COUNSELING:  Reviewed preventive health counseling, as reflected  "in patient instructions       Regular exercise       Healthy diet/nutrition       Vision screening       Osteoporosis prevention/bone health       Colorectal Cancer Screening       ([place])menopause management      BMI:   Estimated body mass index is 28.59 kg/m  as calculated from the following:    Height as of this encounter: 1.753 m (5' 9\").    Weight as of this encounter: 87.8 kg (193 lb 9.6 oz).   Weight management plan: Discussed healthy diet and exercise guidelines      She reports that she has never smoked. She has never been exposed to tobacco smoke. She has never used smokeless tobacco.          Elise Shannon MD  [facility]  "

## 2023-08-21 ENCOUNTER — LAB (OUTPATIENT)
Dept: LAB | Facility: CLINIC | Age: 53
End: 2023-08-21
Payer: COMMERCIAL

## 2023-08-21 DIAGNOSIS — Z13.1 SCREENING FOR DIABETES MELLITUS: ICD-10-CM

## 2023-08-21 DIAGNOSIS — N92.4 EXCESSIVE BLEEDING IN PREMENOPAUSAL PERIOD: ICD-10-CM

## 2023-08-21 DIAGNOSIS — R53.83 OTHER FATIGUE: ICD-10-CM

## 2023-08-21 DIAGNOSIS — R79.89 ABNORMAL TSH: ICD-10-CM

## 2023-08-21 DIAGNOSIS — Z13.220 SCREENING CHOLESTEROL LEVEL: ICD-10-CM

## 2023-08-21 DIAGNOSIS — Z83.49 FAMILY HISTORY OF THYROID DISEASE: ICD-10-CM

## 2023-08-21 LAB
BASOPHILS # BLD AUTO: 0.1 10E3/UL (ref 0–0.2)
BASOPHILS NFR BLD AUTO: 1 %
CHOLEST SERPL-MCNC: 227 MG/DL
DEPRECATED CALCIDIOL+CALCIFEROL SERPL-MC: 17 UG/L (ref 20–75)
EOSINOPHIL # BLD AUTO: 0.3 10E3/UL (ref 0–0.7)
EOSINOPHIL NFR BLD AUTO: 5 %
ERYTHROCYTE [DISTWIDTH] IN BLOOD BY AUTOMATED COUNT: 13.5 % (ref 10–15)
FASTING STATUS PATIENT QL REPORTED: YES
FERRITIN SERPL-MCNC: 30 NG/ML (ref 11–328)
GLUCOSE SERPL-MCNC: 94 MG/DL (ref 70–99)
HCT VFR BLD AUTO: 35.1 % (ref 35–47)
HDLC SERPL-MCNC: 46 MG/DL
HGB BLD-MCNC: 11.3 G/DL (ref 11.7–15.7)
IMM GRANULOCYTES # BLD: 0 10E3/UL
IMM GRANULOCYTES NFR BLD: 0 %
LDLC SERPL CALC-MCNC: 140 MG/DL
LYMPHOCYTES # BLD AUTO: 2.1 10E3/UL (ref 0.8–5.3)
LYMPHOCYTES NFR BLD AUTO: 36 %
MCH RBC QN AUTO: 27.7 PG (ref 26.5–33)
MCHC RBC AUTO-ENTMCNC: 32.2 G/DL (ref 31.5–36.5)
MCV RBC AUTO: 86 FL (ref 78–100)
MONOCYTES # BLD AUTO: 0.3 10E3/UL (ref 0–1.3)
MONOCYTES NFR BLD AUTO: 6 %
NEUTROPHILS # BLD AUTO: 3.1 10E3/UL (ref 1.6–8.3)
NEUTROPHILS NFR BLD AUTO: 53 %
NONHDLC SERPL-MCNC: 181 MG/DL
PLATELET # BLD AUTO: 272 10E3/UL (ref 150–450)
RBC # BLD AUTO: 4.08 10E6/UL (ref 3.8–5.2)
T4 FREE SERPL-MCNC: 1 NG/DL (ref 0.9–1.7)
TRIGL SERPL-MCNC: 207 MG/DL
TSH SERPL DL<=0.005 MIU/L-ACNC: 5.79 UIU/ML (ref 0.3–4.2)
WBC # BLD AUTO: 5.9 10E3/UL (ref 4–11)

## 2023-08-21 PROCEDURE — 86376 MICROSOMAL ANTIBODY EACH: CPT

## 2023-08-21 PROCEDURE — 86800 THYROGLOBULIN ANTIBODY: CPT

## 2023-08-21 PROCEDURE — 80061 LIPID PANEL: CPT

## 2023-08-21 PROCEDURE — 84439 ASSAY OF FREE THYROXINE: CPT

## 2023-08-21 PROCEDURE — 36415 COLL VENOUS BLD VENIPUNCTURE: CPT

## 2023-08-21 PROCEDURE — 82947 ASSAY GLUCOSE BLOOD QUANT: CPT

## 2023-08-21 PROCEDURE — 82306 VITAMIN D 25 HYDROXY: CPT

## 2023-08-21 PROCEDURE — 84443 ASSAY THYROID STIM HORMONE: CPT

## 2023-08-21 PROCEDURE — 85025 COMPLETE CBC W/AUTO DIFF WBC: CPT

## 2023-08-21 PROCEDURE — 82728 ASSAY OF FERRITIN: CPT

## 2023-08-22 DIAGNOSIS — R79.89 ABNORMAL TSH: Primary | ICD-10-CM

## 2023-08-22 NOTE — RESULT ENCOUNTER NOTE
"Efraind,  It was a pleasure to see you in the office recently.   A few findings were noted on your labs.     - Your thyroid test was borderline abnormal. This could indicate you are developing hypothyroidism or an \"underactive thyroid gland\". Symptoms of hypothyroidism could include fatigue, weight gain, depression, constipation, muscle and joint pain. Given the labs is only borderline abnormal, I've added a few additional labs to help us determine if this needs further treatment. I'll keep you posted once they return.   - your blood count panel shows a very mild anemia. Surprisingly, the iron level though was pretty good. Sometimes anemia can occur from other things including thyroid issues or other vitamin deficiencies.   - Your vitamin D level was again low. Low vitamin D can put you at risk for low bone density and commonly increase fatigue and muscle pain. Please start on a over-the-counter vitamin D-3 supplement at 2000 IU per day for one month and then drop the dose to 1,000 international unit(s) per day for long-term management   - The cholesterol panel shows the LDL (bad cholesterol) and triglycerides were high.  I would encourage you to work on lifestyle measures to bring your cholesterol down and reduce cardiovascular risks. Plan to recheck your cholesterol yearly.   Recommendations to reduce cholesterol and cardiovascular risks:  Diet:  -Try to eat more vegetables, fruits, legumes, nuts/seeds, whole grains, and fish.  - try to eat less red meat, processed meats, processed foods, sweetened beverages.   - try to replace saturated fat in your diet with mono- and poly-unsaturated fats   Exercise:  Aim for regular exercise with a goal of 150 min of moderate to high intensity aerobic exercise per week    So, in summary:   I will update you once final thyroid results are back with next steps.   For the mild anemia- lets see what happens with the thyroid tests. Likely we'll just want to dive deeper with a few " other labs to look for causes.  Make sure to get the vitamin D intake up to help your energy.     Please MyChart or call if you have any concerns or questions.   Sincerely,  Elise Shnanon MD

## 2023-08-23 ENCOUNTER — TELEPHONE (OUTPATIENT)
Dept: FAMILY MEDICINE | Facility: CLINIC | Age: 53
End: 2023-08-23
Payer: COMMERCIAL

## 2023-08-23 DIAGNOSIS — E03.9 ACQUIRED HYPOTHYROIDISM: Primary | ICD-10-CM

## 2023-08-23 PROBLEM — R20.9 DISTURBANCE OF SKIN SENSATION: Status: ACTIVE | Noted: 2017-11-07

## 2023-08-23 LAB
THYROGLOB AB SERPL IA-ACNC: <20 IU/ML
THYROPEROXIDASE AB SERPL-ACNC: 194 IU/ML

## 2023-08-23 RX ORDER — LEVOTHYROXINE SODIUM 50 UG/1
50 TABLET ORAL DAILY
Qty: 90 TABLET | Refills: 0 | Status: SHIPPED | OUTPATIENT
Start: 2023-08-23 | End: 2023-12-26

## 2023-08-23 NOTE — RESULT ENCOUNTER NOTE
Brian Drew,  Your thyroid peroxidase antibody continues to be elevated suggesting ongoing Hashimoto's thyroid disease. Give the TSH (thyroid function screening test) is abnormal and you are noting the fatigue, it would be reasonable to begin some thyroid hormone supplementation. Let me know if you would like to start this or if you would like to connect via a visit to discuss this in more detail. Thyroid hormone could potentially improve your energy somewhat, help constipation and mood.   If you are not ready to start thyroid medication, at minimum, I would suggest monitoring the TSH level again in six months.   Also, as I was reviewing your chart, I reviewed again the work up you had in the past for MS. Have you had any new neurologic symptoms? Did you ever complete the full workup with neurology including the spinal tap?  Let me know your thoughts and we can make the final plan   Kind regards,  Elise Shannon MD

## 2023-08-23 NOTE — TELEPHONE ENCOUNTER
Patient called in response to her lad results and providers recommendation. She is OK with starting medication for her thyroid. She would like the RX sent to [facility] in [facility].

## 2023-08-24 NOTE — TELEPHONE ENCOUNTER
RN called patient and LVM to call clinic at [phone].     If patient calls back please relay provider message below.    MARTIR Patel  [facility] Triage

## 2023-08-24 NOTE — TELEPHONE ENCOUNTER
Prescription for levothyroxine 50 mcg daily was sent to her pharmacy.  Please have her talk with her pharmacist about this medication as it requires to be taken on an empty stomach and not within 4 hours of calcium, iron or antacids.  If this medication will help her, she should see a gradual improvement in energy she over the next 6 to 8 weeks, likely not starting for 2 to 3 weeks.  We will need to monitor labs again and 8 weeks to see if this is the correct dose for her.  The labs will help us make adjustments to get her to the right dosing amount.  As it is common to need to adjust several times when for starting the medication.  If she has more questions and happy to review at a visit with her.

## 2023-08-24 NOTE — TELEPHONE ENCOUNTER
Patient calling back and RN relayed provider message below. Patient verbalized good understanding. Lab appointment scheduled for 10/16/2023. No further questions or concerns.    MARTIR Patel  [facility] Triage

## 2023-09-10 NOTE — PROGRESS NOTES
1. 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 Patient is status post 9 strabismus surgeries in the past (most recently in 2015). Today she has a relatively small angle esotropia with decent cosmesis.  She has prominent suppression of the non-fixating eye under binocular conditions and freely alternates fixation.    I do not see any role for strabismus surgery at this time. Discussed with patient that unfortunately there is nothing that she can do behaviorally to prevent her eye from wandering in the future.  If she develops more disfiguring strabismus in the future she could consider repeat strabismus surgery however with her number of surgeries cosmetically displeasing scar tissue is a significant concern.    2. Patient wants updated glasses prescription but was dilated today when I saw patient.  Establish care with optometry at [facility] and recommend that [facility] follow-up with this provider every 1-2 years for routine eye care.      I did not make a follow-up appointment, but I would be happy to see the patient back in the future should any new neuro-ophthalmic concern arise.      Viki Woodard is a pleasant 53 year old White female who presents to my neuro-ophthalmology clinic today having been referred by Dr. Shannon for strabismus evaluation.    HPI:    Patient was born with strabismus and her eyes were turned inwards. She had her first surgery at age 4. The eyes were still turning inward but she was patching and wearing glasses. At age 18 in [place] she had repeat surgery (maybe two surgeries) and her eyes were then turned a little out but this was the best her alignment had ever been for decades. At age 37 the eye that was turned outward a little progressed to turning outwards significantly. She underwent 2 or 3 surgeries in [place] but they were not successful and she was referred to [facility] to see   [place].    Dr. Wallace did several surgeries (3) most recently in . She was doing well for several years but now it feels like her eyes are turning inward. She has some worsening eye strain. In the last 4-5 months it feels like her eyes are pulling inwards. Her family has noticed her eyes aren't aligned and the appearance bothers her. Sometimes she will notice images jump when she changes which eye she is looking with. She thinks her left eye is her dominant eye. She does not ever experience double vision. She has never been able to appreciate 3D movies or puzzles.    Patient previously seen by Dr. Chua on 2022 who at that time didn't believe patient needed further surgery as she had good control. She has never worn prism in her glasses    She had an episode of numbness/tingling in her extremities in  for which she underwent MRI imaging. There were a few small hyperintensities noted on MRI but these were thought to be non-specific and less likely due to MS.    Independent historians:  Patient    Review of outside testin/31/17 MRI Brain:   Impression: 2 small periventricular lesions that could represent  demyelination, but are nonspecific. No definite evidence for active  demyelination.    Review of outside clinical notes:    2022 -- Visit with Dr. Chua    1. Small angle esotropia after multiple surgeries (9)  2. Dissociated vertical deviation  status post 2015 surgery for Recurrent exotropia. Recession of left lateral rectus from 4 mm recessed to 13 mm recessed on an adjustable suture.    status post 2013 surgery for Esotropia.Re-recession of the left medial rectus from 2 mm recessed to 7 mm recessed on an adjustable suture with posterior fixation suture at 13.5 mm back from the original insertion.    status post 2010 LLR advan 3.5 for consecutive esotropia    status post surgery 2010 both eyes 3 muscles for A XT - switching - BLR rec 4.7 with inf transp - LSR rec 4    status  post muscle surgeries, one at the age of 5 years and then two times again as a teenager because of eyes wandering out. When she got to be 31 years old eyes started to drift out again.    Viki felt her eyes were best between the age of [age] years old after that started with more surgeries. I don't feel that surgery would benefit Viki's alignment, she has good control at this time with her alignment.    3. Latent hyperopia  4. Presbyopia    Monitor the vision and alignment until the next visit. If you have concerns or questions, please contact my office.    Follow up in 3-5 years or sooner if something changes. I suggest having more routine eye exams with an optometrist to maintain normal eye health and preventative measures.    Past medical history:    Patient Active Problem List   Diagnosis    H/O eye muscle disorder    Rosacea    Low ferritin    Abnormal serum protein test    Abnormal thyroid stimulating hormone (TSH) level    Anxiety    Disturbance of skin sensation    Mixed hyperlipidemia    Vitamin D deficiency   Hypothyroidism    Patient has a current medication list which includes the following prescription(s): levothyroxine and multivitamin w/minerals. List is confirmed today.    Family history / social history:  Patient's family history includes Breast Cancer (age of onset: 80) in her mother; Heart Disease in her father; Prostate Cancer in her father; Skin Cancer in her father. Father with strabismus. Mother with glaucoma and wet and dry macular degeneration.    Patient  reports that she has never smoked. She has never been exposed to tobacco smoke. She has never used smokeless tobacco. She reports current alcohol use. She reports that she does not use drugs.     Exam:  Visual acuity is 20/20 both eyes. Intraocular pressure 22 both eyes. Pupils are round and normally reactive. Color vision 11/11 both eyes. Please see epic chart for complete exam     Tests ordered and interpreted today:  Sensorimotor  exam with mildly reduced adduction right eye and left eye as well as mild abduction deficit left eye.  No stereopsis on randot.  [place] 4 Dot with alternate suppression.  Bernice shows 8-10 prism diopter RET in primary gaze and she measures slightly larger at 12 prism diopters when fixating right eye.  Patient also has small bilateral disassociated vertical deviations.        Kellie Osuna MD  PGY3 Ophthalmology    Precharting:  Ana Steward MD   Fellow, Neuro-Ophthalmology    45 minutes were spent on the date of the encounter by me doing chart review, history and exam, documentation, and further activities as noted above    Complete documentation of historical and exam elements from today's encounter can be found in the full encounter summary report (not reduplicated in this progress note).  I personally obtained the chief complaint(s) and history of present illness.  I confirmed and edited as necessary the review of systems, past medical/surgical history, family history, social history, and examination findings as documented by others; and I examined the patient myself.  I personally reviewed the relevant tests, images, and reports as documented above.  I formulated and edited as necessary the assessment and plan and discussed the findings and management plan with the patient and family.  I personally reviewed the ophthalmic test(s) associated with this encounter, agree with the interpretation(s) as documented by the resident/fellow, and have edited the corresponding report(s) as necessary.     Bishop Dumont MD

## 2023-09-13 ENCOUNTER — OFFICE VISIT (OUTPATIENT)
Dept: OPHTHALMOLOGY | Facility: CLINIC | Age: 53
End: 2023-09-13
Attending: OPHTHALMOLOGY
Payer: COMMERCIAL

## 2023-09-13 DIAGNOSIS — H50.05 ESOTROPIA, ALTERNATING: ICD-10-CM

## 2023-09-13 DIAGNOSIS — H53.10 SUBJECTIVE VISUAL DISTURBANCE: ICD-10-CM

## 2023-09-13 DIAGNOSIS — H50.00 ESOTROPIA: Primary | ICD-10-CM

## 2023-09-13 DIAGNOSIS — H51.8 DVD (DISSOCIATED VERTICAL DEVIATION): ICD-10-CM

## 2023-09-13 DIAGNOSIS — H53.2 DOUBLE VISION: ICD-10-CM

## 2023-09-13 PROCEDURE — 92060 SENSORIMOTOR EXAMINATION: CPT | Performed by: OPHTHALMOLOGY

## 2023-09-13 PROCEDURE — 99204 OFFICE O/P NEW MOD 45 MIN: CPT | Mod: GC | Performed by: OPHTHALMOLOGY

## 2023-09-13 PROCEDURE — 99207 SENSORIMOTOR: CPT | Mod: 26 | Performed by: OPHTHALMOLOGY

## 2023-09-13 PROCEDURE — G0463 HOSPITAL OUTPT CLINIC VISIT: HCPCS | Performed by: OPHTHALMOLOGY

## 2023-09-13 PROCEDURE — 92060 SENSORIMOTOR EXAMINATION: CPT | Mod: 26 | Performed by: OPHTHALMOLOGY

## 2023-09-13 ASSESSMENT — SLIT LAMP EXAM - LIDS
COMMENTS: NORMAL
COMMENTS: NORMAL

## 2023-09-13 ASSESSMENT — REFRACTION_WEARINGRX
OS_SPHERE: PLANO
SPECS_TYPE: PAL
OD_CYLINDER: SPHERE
OS_AXIS: 073
OS_CYLINDER: +1.00
OD_ADD: +1.50
OS_ADD: +1.50
OD_SPHERE: +1.00

## 2023-09-13 ASSESSMENT — VISUAL ACUITY
CORRECTION_TYPE: GLASSES
METHOD: SNELLEN - LINEAR
OD_CC: 20/20
OS_CC+: -1
OS_CC: 20/20

## 2023-09-13 ASSESSMENT — CONF VISUAL FIELD
OS_INFERIOR_NASAL_RESTRICTION: 0
OD_SUPERIOR_NASAL_RESTRICTION: 0
METHOD: COUNTING FINGERS
OS_SUPERIOR_NASAL_RESTRICTION: 0
OD_INFERIOR_NASAL_RESTRICTION: 0
OD_NORMAL: 1
OS_INFERIOR_TEMPORAL_RESTRICTION: 0
OD_SUPERIOR_TEMPORAL_RESTRICTION: 0
OD_INFERIOR_TEMPORAL_RESTRICTION: 0
OS_SUPERIOR_TEMPORAL_RESTRICTION: 0
OS_NORMAL: 1

## 2023-09-13 ASSESSMENT — TONOMETRY
OD_IOP_MMHG: 22
IOP_METHOD: ICARE
OS_IOP_MMHG: 22

## 2023-09-13 ASSESSMENT — CUP TO DISC RATIO
OS_RATIO: 0.3
OD_RATIO: 0.3

## 2023-09-13 ASSESSMENT — EXTERNAL EXAM - LEFT EYE: OS_EXAM: NORMAL

## 2023-09-13 ASSESSMENT — EXTERNAL EXAM - RIGHT EYE: OD_EXAM: NORMAL

## 2023-09-13 NOTE — LETTER
2023    RE: Viki Woodard  : 1970  MRN: [id number]    Dear Dr. Shannon    Thank you for referring your patient, Viki Woodard, to my neuro-ophthalmology clinic recently.  After a thorough neuro-ophthalmic history and examination, I came to the following conclusions:     1. 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 Patient is status post 9 strabismus surgeries in the past (most recently in ). Today she has a relatively small angle esotropia with decent cosmesis.  She has prominent suppression of the non-fixating eye under binocular conditions and freely alternates fixation.    I do not see any role for strabismus surgery at this time. Discussed with patient that unfortunately there is nothing that she can do behaviorally to prevent her eye from wandering in the future.  If she develops more disfiguring strabismus in the future she could consider repeat strabismus surgery however with her number of surgeries cosmetically displeasing scar tissue is a significant concern.    2. Patient wants updated glasses prescription but was dilated today when I saw patient.  Establish care with optometry at [facility] and recommend follow-up with this provider every 1-2 years for routine eye care.    Viki Woodard is a pleasant 53 year old White female who presents to my neuro-ophthalmology clinic today having been referred by Dr. Shannon for strabismus evaluation.    HPI:  Patient was born with strabismus and her eyes were turned inwards. She had her first surgery at age 4. The eyes were still turning inward but she was patching and wearing glasses. At age 18 in [place] she had repeat surgery (maybe two surgeries) and her eyes were then turned a little out but this was the best her alignment had ever been for decades. At age 37 the eye that was turned outward a little progressed to turning outwards  significantly. She underwent 2 or 3 surgeries in [place] but they were not successful and she was referred to [place] to see Dr. Wallace.    Dr. Wallace did several surgeries (3) most recently in . She was doing well for several years but now it feels like her eyes are turning inward. She has some worsening eye strain. In the last 4-5 months it feels like her eyes are pulling inwards. Her family has noticed her eyes aren't aligned and the appearance bothers her. Sometimes she will notice images jump when she changes which eye she is looking with. She thinks her left eye is her dominant eye. She does not ever experience double vision. She has never been able to appreciate 3D movies or puzzles.    Patient previously seen by Dr. Chua on 2022 who at that time didn't believe patient needed further surgery as she had good control. She has never worn prism in her glasses    She had an episode of numbness/tingling in her extremities in  for which she underwent MRI imaging. There were a few small hyperintensities noted on MRI but these were thought to be non-specific and less likely due to MS.    Independent historians: Patient    Review of outside testin17 MRI Brain:   Impression: 2 small periventricular lesions that could represent  demyelination, but are nonspecific. No definite evidence for active  demyelination.    Review of outside clinical notes:  2022 -- Visit with Dr. Chua    1. Small angle esotropia after multiple surgeries (9)  2. Dissociated vertical deviation  status post 2015 surgery for Recurrent exotropia. Recession of left lateral rectus from 4 mm recessed to 13 mm recessed on an adjustable suture.    status post 2013 surgery for Esotropia.Re-recession of the left medial rectus from 2 mm recessed to 7 mm recessed on an adjustable suture with posterior fixation suture at 13.5 mm back from the original insertion.    status post 2010 LLR advan 3.5 for consecutive  esotropia    status post surgery 4/2010 both eyes 3 muscles for A XT - switching - BLR rec 4.7 with inf transp - LSR rec 4    status post muscle surgeries, one at the age of 5 years and then two times again as a teenager because of eyes wandering out. When she got to be 31 years old eyes started to drift out again.    Viki felt her eyes were best between the age of [age] years old after that started with more surgeries. I don't feel that surgery would benefit Viki's alignment, she has good control at this time with her alignment.    3. Latent hyperopia  4. Presbyopia    Monitor the vision and alignment until the next visit. If you have concerns or questions, please contact my office.    Follow up in 3-5 years or sooner if something changes. I suggest having more routine eye exams with an optometrist to maintain normal eye health and preventative measures.    Past medical history:    Patient Active Problem List   Diagnosis    H/O eye muscle disorder    Rosacea    Low ferritin    Abnormal serum protein test    Abnormal thyroid stimulating hormone (TSH) level    Anxiety    Disturbance of skin sensation    Mixed hyperlipidemia    Vitamin D deficiency   Hypothyroidism    Patient has a current medication list which includes the following prescription(s): levothyroxine and multivitamin w/minerals. List is confirmed today.    Family history / social history: Patient's family history includes Breast Cancer (age of onset: 80) in her mother; Heart Disease in her father; Prostate Cancer in her father; Skin Cancer in her father. Father with strabismus. Mother with glaucoma and wet and dry macular degeneration.    Patient  reports that she has never smoked. She has never been exposed to tobacco smoke. She has never used smokeless tobacco. She reports current alcohol use. She reports that she does not use drugs.     Exam: Visual acuity is 20/20 both eyes. Intraocular pressure 22 both eyes. Pupils are round and normally  reactive. Color vision 11/11 both eyes. Please see epic chart for complete exam     Tests ordered and interpreted today: Sensorimotor exam with mildly reduced adduction right eye and left eye as well as mild abduction deficit left eye.  No stereopsis on randot.  Meigs 4 Dot with alternate suppression.  Bernice shows 8-10 prism diopter RET in primary gaze and she measures slightly larger at 12 prism diopters when fixating right eye.  Patient also has small bilateral disassociated vertical deviations.    Again, thank you for trusting me with the care of your patient.  For further exam details, please feel free to contact our office for additional records.  If you wish to contact me regarding this patient please email me at [email or web address] or give my clinic a call to arrange a phone conversation.    Sincerely,    Bishop Dumont MD  , Neuro-Ophthalmology and Adult Strabismus Surgery  The Gretchen CASTELLANO and Lakisha Chamorro Chair in Neuro-Ophthalmology  Department of Ophthalmology and Visual Neurosciences  [facility]    DX: alternating esotropia, disassociated vertical deviation

## 2023-10-30 ENCOUNTER — LAB (OUTPATIENT)
Dept: LAB | Facility: CLINIC | Age: 53
End: 2023-10-30
Payer: COMMERCIAL

## 2023-10-30 DIAGNOSIS — E03.9 ACQUIRED HYPOTHYROIDISM: ICD-10-CM

## 2023-10-30 LAB
T4 FREE SERPL-MCNC: 1.07 NG/DL (ref 0.9–1.7)
TSH SERPL DL<=0.005 MIU/L-ACNC: 5.31 UIU/ML (ref 0.3–4.2)

## 2023-10-30 PROCEDURE — 84439 ASSAY OF FREE THYROXINE: CPT

## 2023-10-30 PROCEDURE — 36415 COLL VENOUS BLD VENIPUNCTURE: CPT

## 2023-10-30 PROCEDURE — 84443 ASSAY THYROID STIM HORMONE: CPT

## 2023-10-31 DIAGNOSIS — E03.9 ACQUIRED HYPOTHYROIDISM: ICD-10-CM

## 2023-10-31 RX ORDER — LEVOTHYROXINE SODIUM 75 UG/1
75 TABLET ORAL DAILY
Qty: 90 TABLET | Refills: 0 | Status: SHIPPED | OUTPATIENT
Start: 2023-10-31 | End: 2023-12-26

## 2023-10-31 NOTE — RESULT ENCOUNTER NOTE
Viki,  Thanks for coming in for the repeat thyroid testing.  The TSH remains elevated indicating you are not yet getting enough thyroid hormone.  I would suggest we increased your levothyroxine dose to 75 mcg/day.  I would recommend recheck of the TSH level again in 2 months to see if we have hit the right replacement dose.  Please MyChart or call if you have any concerns or questions.   Sincerely,  Elise Shannon MD

## 2023-12-22 ENCOUNTER — LAB (OUTPATIENT)
Dept: LAB | Facility: CLINIC | Age: 53
End: 2023-12-22
Payer: COMMERCIAL

## 2023-12-22 DIAGNOSIS — E03.9 ACQUIRED HYPOTHYROIDISM: ICD-10-CM

## 2023-12-22 LAB — TSH SERPL DL<=0.005 MIU/L-ACNC: 1.91 UIU/ML (ref 0.3–4.2)

## 2023-12-22 PROCEDURE — 84443 ASSAY THYROID STIM HORMONE: CPT

## 2023-12-22 PROCEDURE — 36415 COLL VENOUS BLD VENIPUNCTURE: CPT

## 2023-12-26 DIAGNOSIS — E03.9 ACQUIRED HYPOTHYROIDISM: ICD-10-CM

## 2023-12-26 RX ORDER — LEVOTHYROXINE SODIUM 75 UG/1
75 TABLET ORAL DAILY
Qty: 90 TABLET | Refills: 0 | Status: SHIPPED | OUTPATIENT
Start: 2023-12-26

## 2023-12-26 NOTE — RESULT ENCOUNTER NOTE
Viki,  Your TSH thyroid test is now back into the normal range.  It appears you are on the correct dose of thyroid hormone.  Please continue your current levothyroxine dose and plan for the next TSH level in 6 months.  Please MyChart or call if you have any concerns or questions.   Sincerely,  Elise Shannon MD

## 2024-01-03 ENCOUNTER — TELEPHONE (OUTPATIENT)
Dept: FAMILY MEDICINE | Facility: CLINIC | Age: 54
End: 2024-01-03
Payer: COMMERCIAL

## 2024-01-03 NOTE — TELEPHONE ENCOUNTER
Patient called in and stated that she got the note about her TSH.  She states that she isn't sure she wants to continue the thyroid medication.  She states she is waking up at night now and has gained 6 lbs over the last 4 months.  She states she feels no difference in her energy level, which she states was the main reason for going on this.        Routing to provider to review and advise.    Kristina Kjellberg, MSN, RN  [facility] Triage

## 2024-01-04 ENCOUNTER — TELEPHONE (OUTPATIENT)
Dept: FAMILY MEDICINE | Facility: CLINIC | Age: 54
End: 2024-01-04
Payer: COMMERCIAL

## 2024-01-04 DIAGNOSIS — Z83.49 FAMILY HISTORY OF THYROID DISEASE: ICD-10-CM

## 2024-01-04 DIAGNOSIS — E03.9 ACQUIRED HYPOTHYROIDISM: Primary | ICD-10-CM

## 2024-01-04 DIAGNOSIS — R53.83 OTHER FATIGUE: ICD-10-CM

## 2024-01-04 DIAGNOSIS — R79.89 ABNORMAL TSH: ICD-10-CM

## 2024-01-04 NOTE — TELEPHONE ENCOUNTER
RN called patient on 1/4/2024 and LVM to call clinic at [phone].     If patient calls back please relay provider message below.    MARTIR Patel  [facility] Triage

## 2024-01-04 NOTE — TELEPHONE ENCOUNTER
She can stop the medication if she would like but I would recommend a repeat TSH in 6 to 8 weeks to see where things are at off the medication.  Certainly if she became more symptomatic the medicine could be restarted.

## 2024-01-04 NOTE — TELEPHONE ENCOUNTER
RN called patient and LVM to call clinic at [phone].     If patient calls back please relay provider message below.    MARTIR Pearce  [facility] Triage

## 2024-01-04 NOTE — TELEPHONE ENCOUNTER
She should have been able to see the effects of the thyroid medication within 6 to 8 weeks of being on the medicine especially when the dose was in the correct range.  Given her labs I would not increase the dose as the blood work shows correct dosing.  I suspect her fatigue is not related to the thyroid labs and so I be happy to see her again in the office to review other possibilities of causes of fatigue.  Her thyroid labs were originally in the subclinical range so we only treat in this range if people are symptomatic.  Like I said in my previous message it would be reasonable to stop the thyroid medication and monitor for any new or worsening symptoms.  If the TSH is not climbing significantly off the medication it would be fine to continue to monitor.  I be happy to send her to endocrinology for further evaluation of her thyroid health if she is interested.  Placed the referral in case this is desired.

## 2024-01-04 NOTE — TELEPHONE ENCOUNTER
Pt called back and was informed of below provider msg. She had additional questions and concerns and wanted more clarification on provider msg so was transferred to RN line.

## 2024-01-04 NOTE — TELEPHONE ENCOUNTER
Patient calling in regards to previous recommendation on telephone encounter dated yesterday 1/3/24.     Patient has had low energy problem even after taking the levothyroxine she was prescribed. She states the lab levels went to a normal level which writer notes within lab results. Patient hasn't had any change in energy levels since before and after taking medication. Patient wondering if it is the right medication or if she should even continue to take it as it has made no difference. She states she hasn't had any changes since being on the medication.     Patient had some other questions for provider - Wondering how long the pill would possibly take to work? Should she see a specialist possibly if it's not making a differenece? Anything else that can be prescribed, or possibly a dose change?    Routing to provider to review and advise on patient questions above.    Ramses Liang RN  [facility]

## 2024-01-04 NOTE — TELEPHONE ENCOUNTER
Patient returned call. Writer relayed message below as written. Patient verbalized understanding and wanted to schedule appointment with provider to see other causes of fatigue. Assisted with patient to schedule in office visit with provider.  Patient had no other questions or concerns at this time.    Ramses Liang RN  [facility]

## 2024-01-05 NOTE — TELEPHONE ENCOUNTER
Please see telephone encounter dated 1/4/24. Closing current encounter.    Ramses Liang RN  [facility]

## 2024-04-02 ENCOUNTER — ALLIED HEALTH/NURSE VISIT (OUTPATIENT)
Dept: FAMILY MEDICINE | Facility: CLINIC | Age: 54
End: 2024-04-02
Payer: COMMERCIAL

## 2024-04-02 DIAGNOSIS — Z23 ENCOUNTER FOR IMMUNIZATION: Primary | ICD-10-CM

## 2024-04-02 PROCEDURE — 90471 IMMUNIZATION ADMIN: CPT

## 2024-04-02 PROCEDURE — 99207 PR NO CHARGE NURSE ONLY: CPT

## 2024-04-02 PROCEDURE — 90750 HZV VACC RECOMBINANT IM: CPT

## 2024-04-02 NOTE — PROGRESS NOTES
Prior to immunization administration, verified patients identity using patient s name and date of birth. Please see Immunization Activity for additional information.     Screening Questionnaire for Adult Immunization    Are you sick today?   No   Do you have allergies to medications, food, a vaccine component or latex?   No   Have you ever had a serious reaction after receiving a vaccination?   No   Do you have a long-term health problem with heart, lung, kidney, or metabolic disease (e.g., diabetes), asthma, a blood disorder, no spleen, complement component deficiency, a cochlear implant, or a spinal fluid leak?  Are you on long-term aspirin therapy?   No   Do you have cancer, leukemia, HIV/AIDS, or any other immune system problem?   No   Do you have a parent, brother, or sister with an immune system problem?   No   In the past 3 months, have you taken medications that affect  your immune system, such as prednisone, other steroids, or anticancer drugs; drugs for the treatment of rheumatoid arthritis, Crohn s disease, or psoriasis; or have you had radiation treatments?   No   Have you had a seizure, or a brain or other nervous system problem?   No   During the past year, have you received a transfusion of blood or blood    products, or been given immune (gamma) globulin or antiviral drug?   No   For women: Are you pregnant or is there a chance you could become       pregnant during the next month?   No   Have you received any vaccinations in the past 4 weeks?   No     Immunization questionnaire answers were all negative.    I have reviewed the following standing orders:   This patient is due and qualifies for the Zoster vaccine.    Click here for Zoster Standing Order    I have reviewed the vaccines inclusion and exclusion criteria; No concerns regarding eligibility.     Patient instructed to remain in clinic for 15 minutes afterwards, and to report any adverse reactions.     Screening performed by Елена Rice MA  on 4/2/2024 at 1:05 PM.

## 2024-04-30 ENCOUNTER — LAB REQUISITION (OUTPATIENT)
Dept: LAB | Facility: CLINIC | Age: 54
End: 2024-04-30
Payer: COMMERCIAL

## 2024-04-30 ENCOUNTER — TRANSFERRED RECORDS (OUTPATIENT)
Dept: HEALTH INFORMATION MANAGEMENT | Facility: CLINIC | Age: 54
End: 2024-04-30

## 2024-04-30 DIAGNOSIS — Z13.1 ENCOUNTER FOR SCREENING FOR DIABETES MELLITUS: ICD-10-CM

## 2024-04-30 DIAGNOSIS — Z13.220 ENCOUNTER FOR SCREENING FOR LIPOID DISORDERS: ICD-10-CM

## 2024-04-30 DIAGNOSIS — Z13.29 ENCOUNTER FOR SCREENING FOR OTHER SUSPECTED ENDOCRINE DISORDER: ICD-10-CM

## 2024-04-30 DIAGNOSIS — Z12.4 ENCOUNTER FOR SCREENING FOR MALIGNANT NEOPLASM OF CERVIX: ICD-10-CM

## 2024-04-30 LAB — HBA1C MFR BLD: 5.6 %

## 2024-04-30 PROCEDURE — 82947 ASSAY GLUCOSE BLOOD QUANT: CPT | Mod: ORL | Performed by: OBSTETRICS & GYNECOLOGY

## 2024-04-30 PROCEDURE — 80061 LIPID PANEL: CPT | Mod: ORL | Performed by: OBSTETRICS & GYNECOLOGY

## 2024-04-30 PROCEDURE — 83036 HEMOGLOBIN GLYCOSYLATED A1C: CPT | Performed by: OBSTETRICS & GYNECOLOGY

## 2024-04-30 PROCEDURE — 87624 HPV HI-RISK TYP POOLED RSLT: CPT | Performed by: OBSTETRICS & GYNECOLOGY

## 2024-04-30 PROCEDURE — 84443 ASSAY THYROID STIM HORMONE: CPT | Mod: ORL | Performed by: OBSTETRICS & GYNECOLOGY

## 2024-04-30 PROCEDURE — 87624 HPV HI-RISK TYP POOLED RSLT: CPT | Mod: ORL | Performed by: OBSTETRICS & GYNECOLOGY

## 2024-04-30 PROCEDURE — G0145 SCR C/V CYTO,THINLAYER,RESCR: HCPCS | Performed by: OBSTETRICS & GYNECOLOGY

## 2024-04-30 PROCEDURE — G0145 SCR C/V CYTO,THINLAYER,RESCR: HCPCS | Mod: ORL | Performed by: OBSTETRICS & GYNECOLOGY

## 2024-04-30 PROCEDURE — 84439 ASSAY OF FREE THYROXINE: CPT | Mod: ORL | Performed by: OBSTETRICS & GYNECOLOGY

## 2024-05-01 LAB
CHOLEST SERPL-MCNC: 255 MG/DL
FASTING STATUS PATIENT QL REPORTED: NO
FASTING STATUS PATIENT QL REPORTED: YES
GLUCOSE SERPL-MCNC: 95 MG/DL (ref 70–99)
HDLC SERPL-MCNC: 52 MG/DL
LDLC SERPL CALC-MCNC: 163 MG/DL
NONHDLC SERPL-MCNC: 203 MG/DL
T4 FREE SERPL-MCNC: 0.98 NG/DL (ref 0.9–1.7)
TRIGL SERPL-MCNC: 202 MG/DL
TSH SERPL DL<=0.005 MIU/L-ACNC: 5.01 UIU/ML (ref 0.3–4.2)

## 2024-05-03 LAB
BKR LAB AP GYN ADEQUACY: NORMAL
BKR LAB AP GYN INTERPRETATION: NORMAL
BKR LAB AP HPV REFLEX: NORMAL
BKR LAB AP LMP: NORMAL
BKR LAB AP PREVIOUS ABNL DX: NORMAL
BKR LAB AP PREVIOUS ABNORMAL: NORMAL
PATH REPORT.COMMENTS IMP SPEC: NORMAL
PATH REPORT.COMMENTS IMP SPEC: NORMAL
PATH REPORT.RELEVANT HX SPEC: NORMAL

## 2024-05-07 LAB
HUMAN PAPILLOMA VIRUS 16 DNA: NEGATIVE
HUMAN PAPILLOMA VIRUS 18 DNA: NEGATIVE
HUMAN PAPILLOMA VIRUS FINAL DIAGNOSIS: NORMAL
HUMAN PAPILLOMA VIRUS OTHER HR: NEGATIVE

## 2024-05-08 ENCOUNTER — MEDICAL CORRESPONDENCE (OUTPATIENT)
Dept: HEALTH INFORMATION MANAGEMENT | Facility: CLINIC | Age: 54
End: 2024-05-08
Payer: COMMERCIAL

## 2024-05-09 ENCOUNTER — TRANSCRIBE ORDERS (OUTPATIENT)
Dept: OTHER | Age: 54
End: 2024-05-09

## 2024-05-09 DIAGNOSIS — E03.9 HYPOTHYROIDISM: Primary | ICD-10-CM

## 2024-05-15 ENCOUNTER — OFFICE VISIT (OUTPATIENT)
Dept: ENDOCRINOLOGY | Facility: CLINIC | Age: 54
End: 2024-05-15
Attending: OBSTETRICS & GYNECOLOGY
Payer: COMMERCIAL

## 2024-05-15 VITALS
HEART RATE: 95 BPM | WEIGHT: 187 LBS | BODY MASS INDEX: 27.62 KG/M2 | OXYGEN SATURATION: 97 % | DIASTOLIC BLOOD PRESSURE: 88 MMHG | SYSTOLIC BLOOD PRESSURE: 138 MMHG | RESPIRATION RATE: 18 BRPM

## 2024-05-15 DIAGNOSIS — E04.9 GOITER: ICD-10-CM

## 2024-05-15 DIAGNOSIS — E06.3 HASHIMOTO'S THYROIDITIS: Primary | ICD-10-CM

## 2024-05-15 DIAGNOSIS — E55.9 VITAMIN D DEFICIENCY: ICD-10-CM

## 2024-05-15 PROCEDURE — 99204 OFFICE O/P NEW MOD 45 MIN: CPT | Performed by: INTERNAL MEDICINE

## 2024-05-15 RX ORDER — CHOLECALCIFEROL (VITAMIN D3) 50 MCG
1 TABLET ORAL DAILY
Qty: 90 TABLET | Refills: 3 | Status: SHIPPED | OUTPATIENT
Start: 2024-05-15

## 2024-05-15 NOTE — PATIENT INSTRUCTIONS
.Welcome to the [facility]ology and Diabetes Clinics     Our Endocrinology Clinics are here to provide you with a team-based, collaborative approach in the diagnosis and treatment of patients with diabetes and endocrine disorders. The team is made up of Physicians, Physician Assistants, Certified Diabetes Educators, Registered Nurses, Medical Assistants, Emergency Medical Technicians, and many others, all of whom have the unified goal of providing our patients with high quality care.     Please see below for some helpful tips to best navigate and use the [facility] Endocrinology clinic:     [place] Respect: At [facility], we are committed to a respectful and safe space for all patients, visitors, and staff.  We believe that mutual respect between patients and their care team is the foundation of quality care.  It is our expectation that you will be treated with respect by your care team.  In turn, we ask that all communication with the care team (written and verbal) be respectful and free from profanity, threatening, or abusive language.  Disrespectful communication undermines our therapeutic relationship with you and may result in us being unable to continue to provide your care.    Refills: A provider must see you at least annually to prescribe and refill medications. This is to ensure your safety as well as meet insurance and compliance regulations.    Scheduling: Many of our Providers offer both in-person or video visits. Please call to schedule any needed follow ups as soon as possible because our provider schedules fill up very quickly. Our care team has the right to require an in-person visit when they believe that it is medically necessary. Please remember that for any virtual visits, you must be in the [facility] at the time of the visit, otherwise we are unable to see you and you will need to be rescheduled.    Missed Appointments: If you need to cancel or miss your  scheduled appointment, please call the clinic at [phone] to reschedule.  Please note if you repeatedly miss appointments or repeatedly miss appointments without calling to inform us ahead of time (no-show), the clinic may elect to not allow you to reschedule without speaking to a manager, may require a Partnership In Care Agreement prior to rescheduling, or could result in you no longer being able to receive care from the clinic. Providing the clinic with timely notification if you have to miss an appointment, allows us to better serve the needs of all of our patients.    Primary Care Provider: Our Endocrinologists are Specialists in their field. We expect you to have a Primary Care Provider established to handle any needs outside of your diabetes and endocrine care.  We would be happy to assist you find a Primary Care Provider, if you do not have one.    [employer]: [employer] is a wonderful resource that allows you access to your Care Team via online or the dagmar. Please ask a member of the team if you would like help creating an account. Please note that it may take up to 2 business days for a response. [employer] messages are not reviewed on weekends or after business hours.  Emergent or urgent care needs should never be communicated via [employer]enios.  If you experience a medical emergency call 911 or go to the nearest emergency room.    Labs: It is recommended that you stay within the [facility] System for labs but you are welcome to obtain ordered labs (with some exceptions) from any location of your choice as long as they are able to complete and process the needed labs. If you need us to fax orders to your preferred lab, please provide us the name and fax number of the lab you would like to go to so we can fax the orders. If your labs are drawn outside of the [facility], please have them fax the results to [phone] ([place]) or [phone] ([place]) or via [email or web address]. It is your  responsibility to ensure that outside lab results are sent to us.    We look forward to working with you. Please do not hesitate to reach out with any questions.    Thank you,    The Endocrine Team    [facility] Address:   [place] Address:     [address] [place] [address]    Phone: [phone]  Fax: [phone] [address] [place] [address]    Phone: [phone]  Fax: [phone]     [facility] Cost Estimate Phone Number: [phone]    General Lab and Imaging Scheduling Phone Number: [phone]

## 2024-05-15 NOTE — PROGRESS NOTES
=======================================================  Assessment     Viki Woodard is a 54 year old female with a nonsignificant past medical history, diagnosed with Hashimoto thyroiditis in 2017.  Over the years, the thyroid hormone levels have remained in the lower half of normal range.  In view of her longstanding history of fatigue, treatment with levothyroxine was tried but it did not improve the patient's symptoms.  While taking levothyroxine, the patient experienced metrorrhagia and insomnia.  Clinically, she does have a goiter.    I counseled the patient on the etiology of Hashimoto thyroiditis, signs and symptoms of hypothyroidism.  It is unclear for me why she developed metrorrhagia or insomnia, since her thyroid hormone levels were normal.  Discussed about the fact that there is no definite indication to take levothyroxine, with the caveat that, in the absence of treatment, the thyroid gland might slowly enlarge over the years.  Continual monitoring of the TFTs is reasonable, with a plan to consider starting treatment with levothyroxine if the TSH is approaching a level of 7.  Alternatively, discussed with the patient the option of trying a different levothyroxine brand, but the patient is not interested at this time.    Prior lab work have revealed chronically low vitamin D levels.  The patient takes a vitamin D supplement only during wintertime.  She has not been taking any in the last month.  Not sure of the dose.  I recommended to start taking 2000 units vitamin D daily and have a calcium and vitamin D level checked with her next lab work.    Regarding the thyroid nodule which was benign on the biopsy from 2017, I am going to try to obtain the prior ultrasound images to determine if a follow-up is required.     Orders Placed This Encounter   Procedures    Calcium     =======================================================  The patient is seen in consultation at the request of Dr. Serna  Mariaelena Calabrese.     History of Present Illness:  Viki Woodard is a 54 year old female with a nonsignificant past medical history, formally diagnosed with hypothyroidism in August 2023.  Lab work done at that time revealed positive thyroid peroxidase antibodies, negative antithyroglobulin antibodies, an elevated TSH of 5.79 and a normal free T4 at 1.  On prior lab work, TSH has been either high normal or mildly elevated, dating back to 2017, when thyroid peroxidase antibodies were also positive..  Subsequently, the patient was started on treatment with levothyroxine in August 2023, initially at 50 mcg daily.  In October 2023, the dose was increased to 75 mcg daily.  On 12/22/2023, TSH was normal at 1.91    Back in 2017, she was diagnosed with 1.7 cm left inferior thyroid nodule which was benign on the biopsy from December 2017.  The cytology documented changes consistent with lymphocytic thyroiditis.    The patient reports feeling tired for the last 8 years.  She has been sleeping approximately 10 hours at night, but she does not wake up rested.  She describes the fatigue as a lack of energy, constant throughout the day.  In addition, she has been experiencing hair loss and dry skin, for many years.  With treatment with levothyroxine, she did not noticed any improvement of her longstanding symptoms.  She actually developed insomnia and more frequent menstrual periods, occurring every 2 weeks.  Prior to starting treatment with levothyroxine, her menstrual periods were monthly, regular.  Subsequently, she discontinued the levothyroxine at the end of January and the insomnia resolved.  Her menstrual periods are now regular.     Chani denies experiencing hot flashes.    On outside records, a 1.2 cm left inferior hypoechoic nodule was discovered on an ultrasound done for abnormal TFTs.  It was benign on the biopsy from 12/2017.  The images were not available for my review at the time of the visit.    TSH   Date  Value Ref Range Status   04/30/2024 5.01 (H) 0.30 - 4.20 uIU/mL Final   04/05/2021 3.53 0.40 - 4.00 mU/L Final       Past Medical History   Past Medical History:   Diagnosis Date    PONV (postoperative nausea and vomiting)    Diverticulitis 2021   Strabismus  Hypothyroidism  Fibrocystic breast  Ribs fractures 2017     Past Surgical History   Past Surgical History:   Procedure Laterality Date    ADENOIDECTOMY  as teen    BREAST BIOPSY, RT/LT  2015    needle bx for abnormal mammogram    COLONOSCOPY WITH CO2 INSUFFLATION N/A 10/07/2021    Procedure: COLONOSCOPY, WITH CO2 INSUFFLATION;  Surgeon: Josiah Gray MD;  Location: [facility] OR    deviated septum  in early 20's    repaired     EYE SURGERY      Strabismus repair x 8    PE TUBES Bilateral as child     Current Medications    Current Outpatient Medications:     levothyroxine (SYNTHROID/LEVOTHROID) 75 MCG tablet, Take 1 tablet (75 mcg) by mouth daily, Disp: 90 tablet, Rfl: 0    multivitamin, therapeutic with minerals (MULTI-VITAMIN) TABS tablet, Take 1 tablet by mouth daily, Disp: , Rfl:     Family History   Problem Relation Age of Onset    Heart Disease Father         cabg in 60's    Prostate Cancer Father     Skin Cancer Father     Breast Cancer Mother 80        DCIS stage 1 - HER positive   Mother has hypothyroidism.     Social History  . She has 3 children. She denies smoking, drinking alcohol or using illicit drugs. Occupation: unemployed.     Review of Systems   Systemic:             as above   Eye:                      No eye symptoms   Nadia-Laryngeal:     No nadia-laryngeal symptoms, no dysphagia, no hoarseness, no cough     Breast:                  No breast symptoms  Cardiovascular:    No cardiovascular symptoms, no CP or palpitations   Pulmonary:           No pulmonary symptoms, no SOB or cough    Gastrointestinal:   No gastrointestinal symptoms, no diarrhea or constipation; no nausea    Genitourinary:       No genitourinary symptoms, no  "increased thirst or urination   Endocrine:            longstanding cold intolerance, no increased sweating   Neurological:        some weakness related to deconditioning, no headaches, no tremor, no numbness or tingling sensation, no dizziness   Musculoskeletal:  No musculoskeletal symptoms, no muscle or joint pain   Skin:                     as above   Psychological:      she is a \"worrier\"; no definite anxiety or depression               Vital Signs     Previous Weights:    Wt Readings from Last 10 Encounters:   08/17/23 87.8 kg (193 lb 9.6 oz)   03/13/23 88.7 kg (195 lb 9.6 oz)   05/17/21 86.9 kg (191 lb 8 oz)   04/05/21 87.1 kg (192 lb)   04/04/19 86.8 kg (191 lb 4.8 oz)   10/03/18 83.2 kg (183 lb 6.4 oz)   02/12/18 83.9 kg (185 lb)   12/21/17 84.8 kg (186 lb 14.4 oz)   11/06/17 83.9 kg (185 lb)        /88 (BP Location: Left arm, Patient Position: Sitting, Cuff Size: Adult Regular)   Pulse 95   Resp 18   Wt 84.8 kg (187 lb)   SpO2 97%   BMI 27.62 kg/m      Physical Exam  General Appearance: she is well developed, well nourished and in no distress     Eyes:  conjutivae and extra-ocular motions are normal.                                    pupils round and reactive to light, no lid lag, no stare    HEENT:   oropharynx clear and moist, no JVD, no bruits     Thyroid enlarged bilaterally.  I am not able to delineate thyroid nodules.  Cardiovascular:  regular rhythm, no murmurs, distal pulse palpable, no edema  Respiratory:        chest clear, no rales, no rhonchi   Musculoskeletal:  normal tone and strength  Psychological:          affect and judgment normal  Neurological:  reflexes normal and symmetric, no resting tremor.      Lab Results  I reviewed prior lab results documented in Epic.  TSH   Date Value Ref Range Status   04/30/2024 5.01 (H) 0.30 - 4.20 uIU/mL Final   12/22/2023 1.91 0.30 - 4.20 uIU/mL Final   10/30/2023 5.31 (H) 0.30 - 4.20 uIU/mL Final   08/21/2023 5.79 (H) 0.30 - 4.20 uIU/mL Final "   04/05/2021 3.53 0.40 - 4.00 mU/L Final   02/13/2018 3.52 0.40 - 4.00 mU/L Final   11/20/2017 3.07 0.40 - 4.00 mU/L Final   11/06/2017 4.75 (H) 0.40 - 4.00 mU/L Final     45 minutes spent on the date of the encounter doing chart review, history and exam, documentation and further activities as noted above.

## 2024-05-15 NOTE — NURSING NOTE
Viki Woodard's goals for this visit include:   Chief Complaint   Patient presents with    New Patient     hypothyroid       She requests these members of her care team be copied on today's visit information: yes    PCP: Elise Shannon    Referring Provider:  Daphne Calabrese MD  [address] [place] [address] [place] [address]    /88 (BP Location: Left arm, Patient Position: Sitting, Cuff Size: Adult Regular)   Pulse 95   Resp 18   Wt 84.8 kg (187 lb)   SpO2 97%   BMI 27.62 kg/m      Do you need any medication refills at today's visit? None    Amado Romo, EMT

## 2024-05-17 NOTE — NURSING NOTE
FNA thyroid images from 2017 have been requested from [facility] imaging.    Carlie Renner, [facility]  Adult Endocrinology  [facility]

## 2024-06-04 ENCOUNTER — LAB REQUISITION (OUTPATIENT)
Dept: LAB | Facility: CLINIC | Age: 54
End: 2024-06-04
Payer: COMMERCIAL

## 2024-06-04 DIAGNOSIS — R30.0 DYSURIA: ICD-10-CM

## 2024-06-04 PROCEDURE — 87086 URINE CULTURE/COLONY COUNT: CPT | Mod: ORL | Performed by: ADVANCED PRACTICE MIDWIFE

## 2024-06-06 LAB — BACTERIA UR CULT: NO GROWTH

## 2024-07-12 NOTE — NURSING NOTE
Parathyroid/thyroid ultrasound form 11/22/17 requested from [facility].    Carlie Renner, DANNY  Adult Endocrinology  [facility]

## 2024-12-23 NOTE — MR AVS SNAPSHOT
"              After Visit Summary   10/3/2018    Viki Woodard    MRN: [id number]           Patient Information     Date Of Birth          1970        Visit Information        Provider Department      10/3/2018 7:20 AM Jo Ann Manriquez NP [facility]        Today's Diagnoses     Throat pain    -  1       Follow-ups after your visit        Follow-up notes from your care team     Return in about 2 days (around 10/5/2018), or if symptoms worsen or fail to improve.      Who to contact     If you have questions or need follow up information about today's clinic visit or your schedule please contact [facility] directly at [phone].  Normal or non-critical lab and imaging results will be communicated to you by MyChart, letter or phone within 4 business days after the clinic has received the results. If you do not hear from us within 7 days, please contact the clinic through [employer] or phone. If you have a critical or abnormal lab result, we will notify you by phone as soon as possible.  Submit refill requests through [employer] or call your pharmacy and they will forward the refill request to us. Please allow 3 business days for your refill to be completed.          Additional Information About Your Visit        MyChart Information     [employer] gives you secure access to your electronic health record. If you see a primary care provider, you can also send messages to your care team and make appointments. If you have questions, please call your primary care clinic.  If you do not have a primary care provider, please call [phone] and they will assist you.        Care EveryWhere ID     This is your Care EveryWhere ID. This could be used by other organizations to access your [facility] medical records  [id number]        Your Vitals Were     Pulse Temperature Respirations Height Last Period Pulse Oximetry    91 98.6  F (37  C) (Oral) 20 1.753 m (5' 9\") 09/17/2018 97%    BMI (Body Mass " Teaching Attestation:    I have discussed the history, exam and medical decision making with Kayla Reyes, DO.  I personally interviewed and examined the patient to confirm the diagnoses listed.  Hx of pit cyst- seeing ns and endo- recheck mri 5/25  Borderline dm- diet d/w pt-  Neck strain-sent to p.t.-  Fibromyalgia sx-defers meds- wt loss and cv exercise      This was discussed with the resident and I agree with the assessment and plan as documented.  The plan of care was discussed with the patient.      Alfonso Umana MDTeaching Attestation:    I have discussed the history, exam and medical decision making with Kayla Reyes, DO.  I personally interviewed and examined the patient to confirm the diagnoses listed.      This was discussed with the resident and I agree with the assessment and plan as documented.  The plan of care was discussed with the patient.      Alfonso Umana MD   Index)                   27.08 kg/m2            Blood Pressure from Last 3 Encounters:   10/03/18 112/82   02/12/18 117/82   12/21/17 119/82    Weight from Last 3 Encounters:   10/03/18 83.2 kg (183 lb 6.4 oz)   02/12/18 83.9 kg (185 lb)   12/21/17 84.8 kg (186 lb 14.4 oz)              We Performed the Following     Beta strep group A culture     Strep, Rapid Screen          Today's Medication Changes          These changes are accurate as of 10/3/18  9:30 AM.  If you have any questions, ask your nurse or doctor.               Start taking these medicines.        Dose/Directions    amoxicillin 500 MG capsule   Commonly known as:  AMOXIL   Used for:  Throat pain   Started by:  Jo Ann Manriquez NP        Dose:  500 mg   Take 1 capsule (500 mg) by mouth 3 times daily for 7 days   Quantity:  21 capsule   Refills:  0            Where to get your medicines      These medications were sent to [facility] [address] [place] [id number]     Phone:  [phone]     amoxicillin 500 MG capsule                Primary Care Provider Office Phone # Fax #    Elise Shannon [phone] [address]  [facility] [address]        Equal Access to Services     CHIQUIS GONZALES AH: Hadii jorge a ku hadasho Soomaali, waaxda luqadaha, qaybta kaalmada adeegyada, octavia patton haytanna plasencia. So [facility] [phone].    ATENCIÓN: Si habla español, tiene a kim disposición servicios gratuitos de asistencia lingüística. Llame al [phone].    We comply with applicable federal civil rights laws and [place] laws. We do not discriminate on the basis of race, color, national origin, age, disability, sex, sexual orientation, or gender identity.            Thank you!     Thank you for choosing [facility]  for your care. Our goal is always to provide you with excellent care. Hearing back from our patients is one way we can  continue to improve our services. Please take a few minutes to complete the written survey that you may receive in the mail after your visit with us. Thank you!             Your Updated Medication List - Protect others around you: Learn how to safely use, store and throw away your medicines at www.disposemymeds.org.          This list is accurate as of 10/3/18  9:30 AM.  Always use your most recent med list.                   Brand Name Dispense Instructions for use Diagnosis    amoxicillin 500 MG capsule    AMOXIL    21 capsule    Take 1 capsule (500 mg) by mouth 3 times daily for 7 days    Throat pain       METROGEL EX      Externally apply topically every other day        Multi-vitamin Tabs tablet      Take 1 tablet by mouth daily

## 2025-06-07 ENCOUNTER — HEALTH MAINTENANCE LETTER (OUTPATIENT)
Age: 55
End: 2025-06-07

## (undated) DEVICE — KIT ENDO FIRST STEP DISINFECTANT 200ML W/POUCH EP-4

## (undated) DEVICE — PREP CHLORAPREP 26ML TINTED ORANGE  260815

## (undated) DEVICE — PAD CHUX UNDERPAD 23X24" 7136

## (undated) RX ORDER — FENTANYL CITRATE 50 UG/ML
INJECTION, SOLUTION INTRAMUSCULAR; INTRAVENOUS
Status: DISPENSED
Start: 2021-10-07

## (undated) RX ORDER — SIMETHICONE 40MG/0.6ML
SUSPENSION, DROPS(FINAL DOSAGE FORM)(ML) ORAL
Status: DISPENSED
Start: 2021-10-07

## (undated) RX ORDER — ONDANSETRON 2 MG/ML
INJECTION INTRAMUSCULAR; INTRAVENOUS
Status: DISPENSED
Start: 2021-10-07